# Patient Record
Sex: FEMALE | URBAN - METROPOLITAN AREA
[De-identification: names, ages, dates, MRNs, and addresses within clinical notes are randomized per-mention and may not be internally consistent; named-entity substitution may affect disease eponyms.]

---

## 2023-02-15 ENCOUNTER — OFFICE VISIT (OUTPATIENT)
Dept: URBAN - METROPOLITAN AREA CLINIC 128 | Facility: CLINIC | Age: 83
End: 2023-02-15
Payer: COMMERCIAL

## 2023-02-15 VITALS
HEIGHT: 64 IN | SYSTOLIC BLOOD PRESSURE: 132 MMHG | DIASTOLIC BLOOD PRESSURE: 66 MMHG | BODY MASS INDEX: 25.85 KG/M2 | TEMPERATURE: 97.6 F | WEIGHT: 151.4 LBS

## 2023-02-15 DIAGNOSIS — K59.09 CHANGE IN BOWEL MOVEMENTS INTERMITTENT CONSTIPATION. URGENCY IN THE MORNING.: ICD-10-CM

## 2023-02-15 PROBLEM — 35298007: Status: ACTIVE | Noted: 2023-02-15

## 2023-02-15 PROBLEM — 428283002 HISTORY OF POLYP OF COLON: Status: ACTIVE | Noted: 2023-02-15

## 2023-02-15 PROBLEM — 711150003: Status: ACTIVE | Noted: 2023-02-15

## 2023-02-15 PROCEDURE — 99243 OFF/OP CNSLTJ NEW/EST LOW 30: CPT | Performed by: INTERNAL MEDICINE

## 2023-02-15 PROCEDURE — 99203 OFFICE O/P NEW LOW 30 MIN: CPT | Performed by: INTERNAL MEDICINE

## 2023-02-15 RX ORDER — LEVOTHYROXINE SODIUM 88 UG/1
1 TABLET IN THE MORNING ON AN EMPTY STOMACH TABLET ORAL ONCE A DAY
Status: ACTIVE | COMMUNITY

## 2023-02-15 RX ORDER — SODIUM SULFATE, MAGNESIUM SULFATE, AND POTASSIUM CHLORIDE 17.75; 2.7; 2.25 G/1; G/1; G/1
12 TABLETS TABLET ORAL
Qty: 24 TABLETS | Refills: 0 | OUTPATIENT
Start: 2023-02-15 | End: 2023-02-16

## 2023-02-15 RX ORDER — ROSUVASTATIN CALCIUM 20 MG/1
1 TABLET TABLET, FILM COATED ORAL ONCE A DAY
Status: ACTIVE | COMMUNITY

## 2023-02-15 RX ORDER — ASPIRIN 81 MG/1
1 TABLET TABLET, COATED ORAL ONCE A DAY
Status: ACTIVE | COMMUNITY

## 2023-02-15 RX ORDER — LOSARTAN POTASSIUM 25 MG/1
1 TABLET TABLET ORAL ONCE A DAY
Status: ACTIVE | COMMUNITY

## 2023-02-15 RX ORDER — CLOPIDOGREL 75 MG/1
1 TABLET TABLET, FILM COATED ORAL ONCE A DAY
Status: ACTIVE | COMMUNITY

## 2023-02-15 NOTE — HPI-TODAY'S VISIT:
pt referred by dr. adebayo jimenez for colon cancer screening.  a copy of this note will be sent to referring physician.  pt notes prior colonoscopy in 2005 with no polyps 10 yr f/u given she notes mostly constipation that is responsive to otc chewable fiber she is on metformin for DM  also on plavix for stroke in 6/2022 also has essential tremor history x yrs no fam hx of colon cancer, colon polyps, or gastric cancer denies any reflux normal appetite no n/v no dysphagia.  no other complaints she has hypothyroidism and is on synthroid 88mcg

## 2023-12-01 ENCOUNTER — OFFICE VISIT (OUTPATIENT)
Dept: URBAN - METROPOLITAN AREA CLINIC 128 | Facility: CLINIC | Age: 83
End: 2023-12-01
Payer: COMMERCIAL

## 2023-12-01 ENCOUNTER — TELEPHONE ENCOUNTER (OUTPATIENT)
Dept: URBAN - METROPOLITAN AREA CLINIC 128 | Facility: CLINIC | Age: 83
End: 2023-12-01

## 2023-12-01 ENCOUNTER — DASHBOARD ENCOUNTERS (OUTPATIENT)
Age: 83
End: 2023-12-01

## 2023-12-01 VITALS
TEMPERATURE: 98.3 F | HEIGHT: 64 IN | BODY MASS INDEX: 25.44 KG/M2 | WEIGHT: 149 LBS | SYSTOLIC BLOOD PRESSURE: 148 MMHG | DIASTOLIC BLOOD PRESSURE: 60 MMHG | HEART RATE: 69 BPM

## 2023-12-01 DIAGNOSIS — Z12.11 SCREENING FOR COLON CANCER: ICD-10-CM

## 2023-12-01 DIAGNOSIS — K59.09 OTHER CONSTIPATION: ICD-10-CM

## 2023-12-01 DIAGNOSIS — R10.31 RIGHT LOWER QUADRANT ABDOMINAL PAIN: ICD-10-CM

## 2023-12-01 PROBLEM — 14760008: Status: ACTIVE | Noted: 2023-12-01

## 2023-12-01 PROCEDURE — 99213 OFFICE O/P EST LOW 20 MIN: CPT | Performed by: PHYSICIAN ASSISTANT

## 2023-12-01 RX ORDER — CLOPIDOGREL 75 MG/1
1 TABLET TABLET, FILM COATED ORAL ONCE A DAY
Status: ACTIVE | COMMUNITY

## 2023-12-01 RX ORDER — LOSARTAN POTASSIUM 25 MG/1
1 TABLET TABLET ORAL ONCE A DAY
Status: ACTIVE | COMMUNITY

## 2023-12-01 RX ORDER — ROSUVASTATIN CALCIUM 20 MG/1
1 TABLET TABLET, FILM COATED ORAL ONCE A DAY
Status: ACTIVE | COMMUNITY

## 2023-12-01 RX ORDER — ASPIRIN 81 MG/1
1 TABLET TABLET, COATED ORAL ONCE A DAY
Status: ACTIVE | COMMUNITY

## 2023-12-01 RX ORDER — LEVOTHYROXINE SODIUM 88 UG/1
1 TABLET IN THE MORNING ON AN EMPTY STOMACH TABLET ORAL ONCE A DAY
Status: ACTIVE | COMMUNITY

## 2023-12-01 NOTE — PHYSICAL EXAM GASTROINTESTINAL
Abdomen , soft, tenderness to the RLQ was noted on palpation, nondistended , no guarding or rigidity , no masses palpable , normal bowel sounds, old surgical scars noted, negative CVA tenderness bilaterally Liver and Spleen , no hepatosplenomegaly Rectal deferred

## 2023-12-01 NOTE — HPI-TODAY'S VISIT:
Patient referred by Dr. Kaleb Rodrigez for a colonoscopy. A copy of this note will be sent to referring physician.   pt notes prior colonoscopy in 2005 with no polyps 10 yr f/u given but never had it done She was seen by Dr. Salinas in 02/2023 and he ordered a colonoscopy and PCP clearance was needed to get a colonoscopy as she is on plavix for history of stroke but the request was sent to the PCP 5 times and my MA we did not get a response she notes mostly constipation that is responsive to otc chewable fiber She elicits having occasional RLQ abdominal pain that is chronic she is on metformin for DM  also on plavix for stroke in 6/2022 also has essential tremor history x yrs no fam hx of colon cancer, colon polyps, or gastric cancer denies any reflux normal appetite no n/v no dysphagia she has hypothyroidism and is on synthroid 88mcg

## 2024-01-10 ENCOUNTER — OFFICE VISIT (OUTPATIENT)
Dept: URBAN - METROPOLITAN AREA MEDICAL CENTER 25 | Facility: MEDICAL CENTER | Age: 84
End: 2024-01-10

## 2024-10-30 ENCOUNTER — APPOINTMENT (RX ONLY)
Age: 84
Setting detail: DERMATOLOGY
End: 2024-10-30

## 2024-10-30 DIAGNOSIS — D485 NEOPLASM OF UNCERTAIN BEHAVIOR OF SKIN: ICD-10-CM

## 2024-10-30 DIAGNOSIS — L81.0 POSTINFLAMMATORY HYPERPIGMENTATION: ICD-10-CM

## 2024-10-30 PROBLEM — D48.5 NEOPLASM OF UNCERTAIN BEHAVIOR OF SKIN: Status: ACTIVE | Noted: 2024-10-30

## 2024-10-30 PROCEDURE — ? ADDITIONAL NOTES

## 2024-10-30 PROCEDURE — ? COUNSELING

## 2024-10-30 PROCEDURE — 99203 OFFICE O/P NEW LOW 30 MIN: CPT

## 2024-10-30 ASSESSMENT — LOCATION SIMPLE DESCRIPTION DERM
LOCATION SIMPLE: NOSE
LOCATION SIMPLE: LEFT FOREHEAD

## 2024-10-30 ASSESSMENT — LOCATION ZONE DERM
LOCATION ZONE: NOSE
LOCATION ZONE: FACE

## 2024-10-30 ASSESSMENT — LOCATION DETAILED DESCRIPTION DERM
LOCATION DETAILED: NASAL DORSUM
LOCATION DETAILED: LEFT INFERIOR MEDIAL FOREHEAD

## 2024-10-30 NOTE — PROCEDURE: ADDITIONAL NOTES
Additional Notes: - pt was referred by Mohs Surgeon Willian Ochoa to have lesion evaluated\\n- pt is unsure of what\\n-Spoke with Dr. Ochoa's office who states pt had Bx on R nasal supratip on August 30, 2024 which showed BCC. \\n-Dr. Ochoa spoke with patient about biopsy results and treatment options. Pt was leaning towards radiation, so he sent her to [facility] for radiation consult. \\n-Pt signed records release, \\n***office working on obtaining these records***
Render Risk Assessment In Note?: no
Detail Level: Simple

## 2024-10-30 NOTE — PROCEDURE: MIPS QUALITY
Detail Level: Detailed
Quality 431: Preventive Care And Screening: Unhealthy Alcohol Use - Screening: Patient not identified as an unhealthy alcohol user when screened for unhealthy alcohol use using a systematic screening method
Name And Contact Information For Health Care Proxy: Kevin Dubose [phone]
Quality 226: Preventive Care And Screening: Tobacco Use: Screening And Cessation Intervention: Patient screened for tobacco use and is an ex/non-smoker
Quality 47: Advance Care Plan: Advance Care Planning discussed and documented; advance care plan or surrogate decision maker documented in the medical record.

## 2024-12-13 ENCOUNTER — APPOINTMENT (OUTPATIENT)
Age: 84
Setting detail: DERMATOLOGY
End: 2024-12-13

## 2024-12-13 PROBLEM — C44.311 BASAL CELL CARCINOMA OF SKIN OF NOSE: Status: ACTIVE | Noted: 2024-12-13

## 2024-12-13 PROCEDURE — 77290 THER RAD SIMULAJ FIELD CPLX: CPT

## 2024-12-13 PROCEDURE — ? SUPERFICIAL RADIOTHERAPY: SIMULATION VISIT

## 2024-12-13 PROCEDURE — ? SUPERFICIAL RADIATION TREATMENT

## 2024-12-13 PROCEDURE — 77300 RADIATION THERAPY DOSE PLAN: CPT

## 2024-12-13 NOTE — PROCEDURE: SUPERFICIAL RADIATION TREATMENT
Bill For Simulation: Yes - (Complex Simulation: 98793)
Validate Note Data (See Information Below): No
Field Number: Right Nasal Supratip ([id number])
Intro Statement (Will Not Render If Left Blank): The patient is undergoing superficial radiation therapy for skin cancer and presents for weekly evaluation and management.  Per protocol and as documented on the flow sheet, the patient was questioned as to subjective redness, pruritus, pain, drainage, fatigue, or any other symptoms.  Objectively, the radiation area was evaluated with regards to erythema, atrophy, scale, crusting, erosion, ulceration, edema, purpura, tenderness, warmth, drainage, and any other findings.  The plan was extensively reviewed including the dose, and dosing schedule.  The simulation and clinical setup was also reviewed as was the external and any internal shields and based on this review the appropriateness and sufficiency of treatment was determined.
Total Rx Dosage (Required): 5467.2
Energy (Optional-Please Include Units): 50 kV
Render Patient Eligibility And Selection In Note?: Yes
Port Dimensions-Y Axis In Cm: 3
Pathology Override (Pathology Will Render As Diagnosis Name If Left Blank): Superficial and Nodular BCC
Please Choose The Type Of Visit (Required): Treatment and Simulation Visit: Show Treatment/Simulation Variables
Fractionation Number Override: 0
Number Of Days Off Treatment: 1
Functional Status: 0 (fully active)
Daily Dosage Administered For All Fractions (Required): 321.6
Simple Simulation Afterword Text Will Be Included With Simple Simulations (Indications............): The patient had a complete consultation regarding all applicable modalities for the treatment of their skin cancer and based on a variety of factors including the type of tumor, size, and location, the relevant medical history as well as local tissue factors, the functional status of the individual, the ability to perform necessary postoperative wound instructions and the need for simultaneous treatments as well as overall wound healing status, it was determined that the patient would begin radiation therapy treatment for skin cancer.  A full simulation and treatment device design was performed including the determination and formulation of appropriate simple and complex devices including lead shield of 0.762 mm thickness to form molded customized shielding to specifically correlate with the lesion size including treatment margin.  The custom lead shield is adequate to accommodate the appropriate applicator and provide adequate shielding around the treatment site.  The specific field applicator, shields, and devices both simple and complex as well as the specific patient setup is outlined below.  The patient was given a full consent for superficial radiation to both verbally and in writing and the full determination of patient's eligibility for treatment and selection is outlined on the patient eligibility and treatment selection form.  The specific superficial radiotherapy prescription was determined and was documented on the superficial radiotherapy prescription form.  A treatment calculation was also performed and documented on the treatment calculation form.  Based on the prescription, the patient was scheduled for a series of fractional treatments.
Treatment Device Design After Initial Simulation Justification (Will Render If Bill For Treatment Devices = Yes): The patient is status post radiation simulation and is evaluated as to the use of additional devices for shielding and placement for radiation therapy.
Fractions / Week: 2
Dimensions-Y Axis In Cm: 0.4
Computed Treatment Time In Min (Will Render The Same As Calculated Treatment Time If Left Blank): 0.44
Detail Level: Detailed
Field Size (Applicator): 1.5 cm
Total Dose (Optional-Please Include Units): 5467.2 cGy
Additional Prescription Justification Text: If there is any interruption in treatment exceeding 5 days please see Decay and Dose Adjustment Calculation and complete treatment under Prescription 2, 3 or 4 as appropriate.
Simple Simulation Preamble Text Will Be Included With Simple Simulations (.......... Indications): Simple simulation was performed today for the following reasons:
Treatment Time / Fractionation (Optional- Include Units): 731 cGy/sec
Patient Positioning: Supine
Time Dose Fractionation (Optional- Include Units If Applicable): 0.44 min
Total Planned Fractions: 17
Assessment: Appropriate reaction
Energy Output In Cgy/Min (Optional): 731
Fractionation Number (Evaluation): No Treatment Administered
Ultrasound Used Text: Ultrasound was utilized to place radiation therapy fields.
Information: Selecting Yes will display possible errors in your note based on the variables you have selected. This validation is only offered as a suggestion for you. PLEASE NOTE THAT THE VALIDATION TEXT WILL BE REMOVED WHEN YOU FINALIZE YOUR NOTE. IF YOU WANT TO FAX A PRELIMINARY NOTE YOU WILL NEED TO TOGGLE THIS TO 'NO' IF YOU DO NOT WANT IT IN YOUR FAXED NOTE.
Dimensions-X Axis In Cm: 0.3
Other Assessment (Will Overide Current Assessment): Appropriate for SRT
Ultrasound Used Text: Patient has a location which was not amenable to ultrasound.
Custom Shielding Afterword Text Will Not Be Included With Simple Simulations (X X Y Cm............): port to correlate with the lesion size, including treatment margin. The custom lead shield is adequate to accommodate the appropriate applicator and provide adequate shielding around the treatment site. Additional shielding (as noted below) is used to protect sensitive, normal tissues.
Ssd In Cm (Optional): 15
Custom Shielding Preamble Text Will Not Be Included With Simple Simulations (.......... X X Y Cm): A lead shield of 0.762 mm thickness is utilized to form a molded, custom shield with a
Daily Fractionated Dose (Optional- Include Units): 321.6 cGy
Radiation Units: cGy
Use Automated Fraction Number And Cumulative Dosage?: Yes (Use Enhanced Automation)
Treatment Margins In Cm: 0.5

## 2024-12-13 NOTE — PROCEDURE: SUPERFICIAL RADIOTHERAPY: SIMULATION VISIT
Bill For Dosimetry/Prescription Creation (62327): Yes
Bill For Treatment Device Design (70929, 03798, 67928): No
Patient Positioning: Sitting

## 2024-12-18 ENCOUNTER — APPOINTMENT (OUTPATIENT)
Age: 84
Setting detail: DERMATOLOGY
End: 2024-12-18

## 2024-12-18 PROBLEM — C44.311 BASAL CELL CARCINOMA OF SKIN OF NOSE: Status: ACTIVE | Noted: 2024-12-18

## 2024-12-18 PROCEDURE — 77280 THER RAD SIMULAJ FIELD SMPL: CPT

## 2024-12-18 PROCEDURE — ? SUPERFICIAL RADIATION TREATMENT

## 2024-12-18 PROCEDURE — 77401: CPT

## 2024-12-18 NOTE — PROCEDURE: SUPERFICIAL RADIATION TREATMENT
Bill For Simulation: Yes - (Simple Simulation: 21935)
Validate Note Data (See Information Below): No
Field Number: Right Nasal Supratip ([id number])
Intro Statement (Will Not Render If Left Blank): The patient is undergoing superficial radiation therapy for skin cancer and presents for weekly evaluation and management.  Per protocol and as documented on the flow sheet, the patient was questioned as to subjective redness, pruritus, pain, drainage, fatigue, or any other symptoms.  Objectively, the radiation area was evaluated with regards to erythema, atrophy, scale, crusting, erosion, ulceration, edema, purpura, tenderness, warmth, drainage, and any other findings.  The plan was extensively reviewed including the dose, and dosing schedule.  The simulation and clinical setup was also reviewed as was the external and any internal shields and based on this review the appropriateness and sufficiency of treatment was determined.
Total Rx Dosage (Required): 5467.2
Energy (Optional-Please Include Units): 50 kV
Render Patient Eligibility And Selection In Note?: Yes
Port Dimensions-Y Axis In Cm: 3
Pathology Override (Pathology Will Render As Diagnosis Name If Left Blank): Superficial and Nodular BCC
Please Choose The Type Of Visit (Required): Treatment and Simulation Visit: Show Treatment/Simulation Variables
Fractionation Number Override: 0
Number Of Days Off Treatment: 1
Functional Status: 0 (fully active)
Daily Dosage Administered For All Fractions (Required): 321.6
Simple Simulation Afterword Text Will Be Included With Simple Simulations (Indications............): The patient had a complete consultation regarding all applicable modalities for the treatment of their skin cancer and based on a variety of factors including the type of tumor, size, and location, the relevant medical history as well as local tissue factors, the functional status of the individual, the ability to perform necessary postoperative wound instructions and the need for simultaneous treatments as well as overall wound healing status, it was determined that the patient would begin radiation therapy treatment for skin cancer.  A full simulation and treatment device design was performed including the determination and formulation of appropriate simple and complex devices including lead shield of 0.762 mm thickness to form molded customized shielding to specifically correlate with the lesion size including treatment margin.  The custom lead shield is adequate to accommodate the appropriate applicator and provide adequate shielding around the treatment site.  The specific field applicator, shields, and devices both simple and complex as well as the specific patient setup is outlined below.  The patient was given a full consent for superficial radiation to both verbally and in writing and the full determination of patient's eligibility for treatment and selection is outlined on the patient eligibility and treatment selection form.  The specific superficial radiotherapy prescription was determined and was documented on the superficial radiotherapy prescription form.  A treatment calculation was also performed and documented on the treatment calculation form.  Based on the prescription, the patient was scheduled for a series of fractional treatments.
Treatment Device Design After Initial Simulation Justification (Will Render If Bill For Treatment Devices = Yes): The patient is status post radiation simulation and is evaluated as to the use of additional devices for shielding and placement for radiation therapy.
Fractions / Week: 2
Dimensions-Y Axis In Cm: 0.4
Computed Treatment Time In Min (Will Render The Same As Calculated Treatment Time If Left Blank): 0.44
Detail Level: Detailed
Field Size (Applicator): 1.5 cm
Total Dose (Optional-Please Include Units): 5467.2 cGy
Additional Prescription Justification Text: If there is any interruption in treatment exceeding 5 days please see Decay and Dose Adjustment Calculation and complete treatment under Prescription 2, 3 or 4 as appropriate.
Simple Simulation Preamble Text Will Be Included With Simple Simulations (.......... Indications): Simple simulation was performed today for the following reasons:
Treatment Time / Fractionation (Optional- Include Units): 731 cGy/sec
Patient Positioning: Supine
Time Dose Fractionation (Optional- Include Units If Applicable): 0.44 min
Total Planned Fractions: 17
Assessment: Appropriate reaction
Energy Output In Cgy/Min (Optional): 731
Fractionation Number (Evaluation): No Treatment Administered
Ultrasound Used Text: Ultrasound was utilized to place radiation therapy fields.
Information: Selecting Yes will display possible errors in your note based on the variables you have selected. This validation is only offered as a suggestion for you. PLEASE NOTE THAT THE VALIDATION TEXT WILL BE REMOVED WHEN YOU FINALIZE YOUR NOTE. IF YOU WANT TO FAX A PRELIMINARY NOTE YOU WILL NEED TO TOGGLE THIS TO 'NO' IF YOU DO NOT WANT IT IN YOUR FAXED NOTE.
Dimensions-X Axis In Cm: 0.3
Other Assessment (Will Overide Current Assessment): Appropriate for SRT
Ultrasound Used Text: Patient has a location which was not amenable to ultrasound.
Custom Shielding Afterword Text Will Not Be Included With Simple Simulations (X X Y Cm............): port to correlate with the lesion size, including treatment margin. The custom lead shield is adequate to accommodate the appropriate applicator and provide adequate shielding around the treatment site. Additional shielding (as noted below) is used to protect sensitive, normal tissues.
Ssd In Cm (Optional): 15
Date Of Fraction 1: 12/18/2024
Custom Shielding Preamble Text Will Not Be Included With Simple Simulations (.......... X X Y Cm): A lead shield of 0.762 mm thickness is utilized to form a molded, custom shield with a
Daily Fractionated Dose (Optional- Include Units): 321.6 cGy
Radiation Units: cGy
Use Automated Fraction Number And Cumulative Dosage?: Yes (Use Enhanced Automation)
Treatment Margins In Cm: 0.5

## 2024-12-20 ENCOUNTER — APPOINTMENT (OUTPATIENT)
Age: 84
Setting detail: DERMATOLOGY
End: 2024-12-20

## 2024-12-20 PROBLEM — C44.311 BASAL CELL CARCINOMA OF SKIN OF NOSE: Status: ACTIVE | Noted: 2024-12-20

## 2024-12-20 PROCEDURE — 77401: CPT

## 2024-12-20 PROCEDURE — 77280 THER RAD SIMULAJ FIELD SMPL: CPT

## 2024-12-20 PROCEDURE — ? SUPERFICIAL RADIATION TREATMENT

## 2024-12-20 NOTE — PROCEDURE: SUPERFICIAL RADIATION TREATMENT
Bill For Simulation: Yes - (Simple Simulation: 05569)
Validate Note Data (See Information Below): No
Field Number: Right Nasal Supratip ([id number])
Intro Statement (Will Not Render If Left Blank): The patient is undergoing superficial radiation therapy for skin cancer and presents for weekly evaluation and management.  Per protocol and as documented on the flow sheet, the patient was questioned as to subjective redness, pruritus, pain, drainage, fatigue, or any other symptoms.  Objectively, the radiation area was evaluated with regards to erythema, atrophy, scale, crusting, erosion, ulceration, edema, purpura, tenderness, warmth, drainage, and any other findings.  The plan was extensively reviewed including the dose, and dosing schedule.  The simulation and clinical setup was also reviewed as was the external and any internal shields and based on this review the appropriateness and sufficiency of treatment was determined.
Total Rx Dosage (Required): 5467.2
Date Of Fraction 2: 12/20/2024
Energy (Optional-Please Include Units): 50 kV
Render Patient Eligibility And Selection In Note?: Yes
Port Dimensions-Y Axis In Cm: 3
Pathology Override (Pathology Will Render As Diagnosis Name If Left Blank): Superficial and Nodular BCC
Please Choose The Type Of Visit (Required): Treatment and Simulation Visit: Show Treatment/Simulation Variables
Fractionation Number Override: 0
Number Of Days Off Treatment: 1
Functional Status: 0 (fully active)
Daily Dosage Administered For All Fractions (Required): 321.6
Simple Simulation Afterword Text Will Be Included With Simple Simulations (Indications............): The patient had a complete consultation regarding all applicable modalities for the treatment of their skin cancer and based on a variety of factors including the type of tumor, size, and location, the relevant medical history as well as local tissue factors, the functional status of the individual, the ability to perform necessary postoperative wound instructions and the need for simultaneous treatments as well as overall wound healing status, it was determined that the patient would begin radiation therapy treatment for skin cancer.  A full simulation and treatment device design was performed including the determination and formulation of appropriate simple and complex devices including lead shield of 0.762 mm thickness to form molded customized shielding to specifically correlate with the lesion size including treatment margin.  The custom lead shield is adequate to accommodate the appropriate applicator and provide adequate shielding around the treatment site.  The specific field applicator, shields, and devices both simple and complex as well as the specific patient setup is outlined below.  The patient was given a full consent for superficial radiation to both verbally and in writing and the full determination of patient's eligibility for treatment and selection is outlined on the patient eligibility and treatment selection form.  The specific superficial radiotherapy prescription was determined and was documented on the superficial radiotherapy prescription form.  A treatment calculation was also performed and documented on the treatment calculation form.  Based on the prescription, the patient was scheduled for a series of fractional treatments.
Treatment Device Design After Initial Simulation Justification (Will Render If Bill For Treatment Devices = Yes): The patient is status post radiation simulation and is evaluated as to the use of additional devices for shielding and placement for radiation therapy.
Fractions / Week: 2
Dimensions-Y Axis In Cm: 0.4
Computed Treatment Time In Min (Will Render The Same As Calculated Treatment Time If Left Blank): 0.44
Detail Level: Detailed
Field Size (Applicator): 1.5 cm
Total Dose (Optional-Please Include Units): 5467.2 cGy
Additional Prescription Justification Text: If there is any interruption in treatment exceeding 5 days please see Decay and Dose Adjustment Calculation and complete treatment under Prescription 2, 3 or 4 as appropriate.
Simple Simulation Preamble Text Will Be Included With Simple Simulations (.......... Indications): Simple simulation was performed today for the following reasons:
Treatment Time / Fractionation (Optional- Include Units): 731 cGy/sec
Patient Positioning: Supine
Time Dose Fractionation (Optional- Include Units If Applicable): 0.44 min
Total Planned Fractions: 17
Assessment: Appropriate reaction
Energy Output In Cgy/Min (Optional): 731
Fractionation Number (Evaluation): No Treatment Administered
Ultrasound Used Text: Ultrasound was utilized to place radiation therapy fields.
Information: Selecting Yes will display possible errors in your note based on the variables you have selected. This validation is only offered as a suggestion for you. PLEASE NOTE THAT THE VALIDATION TEXT WILL BE REMOVED WHEN YOU FINALIZE YOUR NOTE. IF YOU WANT TO FAX A PRELIMINARY NOTE YOU WILL NEED TO TOGGLE THIS TO 'NO' IF YOU DO NOT WANT IT IN YOUR FAXED NOTE.
Dimensions-X Axis In Cm: 0.3
Other Assessment (Will Overide Current Assessment): Appropriate for SRT
Ultrasound Used Text: Patient has a location which was not amenable to ultrasound.
Custom Shielding Afterword Text Will Not Be Included With Simple Simulations (X X Y Cm............): port to correlate with the lesion size, including treatment margin. The custom lead shield is adequate to accommodate the appropriate applicator and provide adequate shielding around the treatment site. Additional shielding (as noted below) is used to protect sensitive, normal tissues.
Ssd In Cm (Optional): 15
Date Of Fraction 1: 12/18/2024
Custom Shielding Preamble Text Will Not Be Included With Simple Simulations (.......... X X Y Cm): A lead shield of 0.762 mm thickness is utilized to form a molded, custom shield with a
Daily Fractionated Dose (Optional- Include Units): 321.6 cGy
Radiation Units: cGy
Use Automated Fraction Number And Cumulative Dosage?: Yes (Use Enhanced Automation)
Treatment Margins In Cm: 0.5

## 2025-01-02 ENCOUNTER — APPOINTMENT (OUTPATIENT)
Age: 85
Setting detail: DERMATOLOGY
End: 2025-01-02

## 2025-01-02 PROBLEM — C44.311 BASAL CELL CARCINOMA OF SKIN OF NOSE: Status: ACTIVE | Noted: 2025-01-02

## 2025-01-02 PROCEDURE — ? SUPERFICIAL RADIATION TREATMENT

## 2025-01-02 PROCEDURE — 77280 THER RAD SIMULAJ FIELD SMPL: CPT

## 2025-01-02 PROCEDURE — 77401: CPT

## 2025-01-02 NOTE — PROCEDURE: SUPERFICIAL RADIATION TREATMENT
Bill For Simulation: Yes - (Simple Simulation: 15752)
Validate Note Data (See Information Below): No
Field Number: Right Nasal Supratip ([id number])
Intro Statement (Will Not Render If Left Blank): The patient is undergoing superficial radiation therapy for skin cancer and presents for weekly evaluation and management.  Per protocol and as documented on the flow sheet, the patient was questioned as to subjective redness, pruritus, pain, drainage, fatigue, or any other symptoms.  Objectively, the radiation area was evaluated with regards to erythema, atrophy, scale, crusting, erosion, ulceration, edema, purpura, tenderness, warmth, drainage, and any other findings.  The plan was extensively reviewed including the dose, and dosing schedule.  The simulation and clinical setup was also reviewed as was the external and any internal shields and based on this review the appropriateness and sufficiency of treatment was determined.
Total Rx Dosage (Required): 5467.2
Date Of Fraction 2: 12/20/2024
Energy (Optional-Please Include Units): 50 kV
Render Patient Eligibility And Selection In Note?: Yes
Port Dimensions-Y Axis In Cm: 3
Pathology Override (Pathology Will Render As Diagnosis Name If Left Blank): Superficial and Nodular BCC
Please Choose The Type Of Visit (Required): Treatment and Simulation Visit: Show Treatment/Simulation Variables
Fractionation Number Override: 0
Date Of Fraction 3: 01/02/2025
Number Of Days Off Treatment: 1
Functional Status: 0 (fully active)
Daily Dosage Administered For All Fractions (Required): 321.6
Simple Simulation Afterword Text Will Be Included With Simple Simulations (Indications............): The patient had a complete consultation regarding all applicable modalities for the treatment of their skin cancer and based on a variety of factors including the type of tumor, size, and location, the relevant medical history as well as local tissue factors, the functional status of the individual, the ability to perform necessary postoperative wound instructions and the need for simultaneous treatments as well as overall wound healing status, it was determined that the patient would begin radiation therapy treatment for skin cancer.  A full simulation and treatment device design was performed including the determination and formulation of appropriate simple and complex devices including lead shield of 0.762 mm thickness to form molded customized shielding to specifically correlate with the lesion size including treatment margin.  The custom lead shield is adequate to accommodate the appropriate applicator and provide adequate shielding around the treatment site.  The specific field applicator, shields, and devices both simple and complex as well as the specific patient setup is outlined below.  The patient was given a full consent for superficial radiation to both verbally and in writing and the full determination of patient's eligibility for treatment and selection is outlined on the patient eligibility and treatment selection form.  The specific superficial radiotherapy prescription was determined and was documented on the superficial radiotherapy prescription form.  A treatment calculation was also performed and documented on the treatment calculation form.  Based on the prescription, the patient was scheduled for a series of fractional treatments.
Treatment Device Design After Initial Simulation Justification (Will Render If Bill For Treatment Devices = Yes): The patient is status post radiation simulation and is evaluated as to the use of additional devices for shielding and placement for radiation therapy.
Fractions / Week: 2
Dimensions-Y Axis In Cm: 0.4
Computed Treatment Time In Min (Will Render The Same As Calculated Treatment Time If Left Blank): 0.44
Detail Level: Detailed
Field Size (Applicator): 1.5 cm
Total Dose (Optional-Please Include Units): 5467.2 cGy
Additional Prescription Justification Text: If there is any interruption in treatment exceeding 5 days please see Decay and Dose Adjustment Calculation and complete treatment under Prescription 2, 3 or 4 as appropriate.
Simple Simulation Preamble Text Will Be Included With Simple Simulations (.......... Indications): Simple simulation was performed today for the following reasons:
Treatment Time / Fractionation (Optional- Include Units): 731 cGy/sec
Patient Positioning: Supine
Time Dose Fractionation (Optional- Include Units If Applicable): 0.44 min
Total Planned Fractions: 17
Assessment: Appropriate reaction
Energy Output In Cgy/Min (Optional): 731
Fractionation Number (Evaluation): No Treatment Administered
Ultrasound Used Text: Ultrasound was utilized to place radiation therapy fields.
Information: Selecting Yes will display possible errors in your note based on the variables you have selected. This validation is only offered as a suggestion for you. PLEASE NOTE THAT THE VALIDATION TEXT WILL BE REMOVED WHEN YOU FINALIZE YOUR NOTE. IF YOU WANT TO FAX A PRELIMINARY NOTE YOU WILL NEED TO TOGGLE THIS TO 'NO' IF YOU DO NOT WANT IT IN YOUR FAXED NOTE.
Dimensions-X Axis In Cm: 0.3
Other Assessment (Will Overide Current Assessment): Appropriate for SRT
Ultrasound Used Text: Patient has a location which was not amenable to ultrasound.
Custom Shielding Afterword Text Will Not Be Included With Simple Simulations (X X Y Cm............): port to correlate with the lesion size, including treatment margin. The custom lead shield is adequate to accommodate the appropriate applicator and provide adequate shielding around the treatment site. Additional shielding (as noted below) is used to protect sensitive, normal tissues.
Ssd In Cm (Optional): 15
Date Of Fraction 1: 12/18/2024
Custom Shielding Preamble Text Will Not Be Included With Simple Simulations (.......... X X Y Cm): A lead shield of 0.762 mm thickness is utilized to form a molded, custom shield with a
Daily Fractionated Dose (Optional- Include Units): 321.6 cGy
Radiation Units: cGy
Use Automated Fraction Number And Cumulative Dosage?: Yes (Use Enhanced Automation)
Treatment Margins In Cm: 0.5

## 2025-02-11 ENCOUNTER — APPOINTMENT (OUTPATIENT)
Age: 85
Setting detail: DERMATOLOGY
End: 2025-02-11

## 2025-02-11 PROBLEM — C44.311 BASAL CELL CARCINOMA OF SKIN OF NOSE: Status: ACTIVE | Noted: 2025-02-11

## 2025-02-11 PROCEDURE — ? SUPERFICIAL RADIATION TREATMENT

## 2025-02-11 PROCEDURE — 77401: CPT

## 2025-02-11 PROCEDURE — 77280 THER RAD SIMULAJ FIELD SMPL: CPT

## 2025-02-11 NOTE — PROCEDURE: SUPERFICIAL RADIATION TREATMENT
Bill For Simulation: Yes - (Simple Simulation: 54593)
Validate Note Data (See Information Below): No
Field Number: Right Nasal Supratip ([id number])
Intro Statement (Will Not Render If Left Blank): The patient is undergoing superficial radiation therapy for skin cancer and presents for weekly evaluation and management.  Per protocol and as documented on the flow sheet, the patient was questioned as to subjective redness, pruritus, pain, drainage, fatigue, or any other symptoms.  Objectively, the radiation area was evaluated with regards to erythema, atrophy, scale, crusting, erosion, ulceration, edema, purpura, tenderness, warmth, drainage, and any other findings.  The plan was extensively reviewed including the dose, and dosing schedule.  The simulation and clinical setup was also reviewed as was the external and any internal shields and based on this review the appropriateness and sufficiency of treatment was determined.
Total Rx Dosage (Required): 5467.2
Date Of Fraction 2: 12/20/2024
Energy (Optional-Please Include Units): 50 kV
Render Patient Eligibility And Selection In Note?: Yes
Port Dimensions-Y Axis In Cm: 3
Pathology Override (Pathology Will Render As Diagnosis Name If Left Blank): Superficial and Nodular BCC
Please Choose The Type Of Visit (Required): Treatment and Simulation Visit: Show Treatment/Simulation Variables
Fractionation Number Override: 0
Date Of Fraction 3: 01/02/2025
Number Of Days Off Treatment: 1
Functional Status: 0 (fully active)
Daily Dosage Administered For All Fractions (Required): 321.6
Simple Simulation Afterword Text Will Be Included With Simple Simulations (Indications............): The patient had a complete consultation regarding all applicable modalities for the treatment of their skin cancer and based on a variety of factors including the type of tumor, size, and location, the relevant medical history as well as local tissue factors, the functional status of the individual, the ability to perform necessary postoperative wound instructions and the need for simultaneous treatments as well as overall wound healing status, it was determined that the patient would begin radiation therapy treatment for skin cancer.  A full simulation and treatment device design was performed including the determination and formulation of appropriate simple and complex devices including lead shield of 0.762 mm thickness to form molded customized shielding to specifically correlate with the lesion size including treatment margin.  The custom lead shield is adequate to accommodate the appropriate applicator and provide adequate shielding around the treatment site.  The specific field applicator, shields, and devices both simple and complex as well as the specific patient setup is outlined below.  The patient was given a full consent for superficial radiation to both verbally and in writing and the full determination of patient's eligibility for treatment and selection is outlined on the patient eligibility and treatment selection form.  The specific superficial radiotherapy prescription was determined and was documented on the superficial radiotherapy prescription form.  A treatment calculation was also performed and documented on the treatment calculation form.  Based on the prescription, the patient was scheduled for a series of fractional treatments.
Treatment Device Design After Initial Simulation Justification (Will Render If Bill For Treatment Devices = Yes): The patient is status post radiation simulation and is evaluated as to the use of additional devices for shielding and placement for radiation therapy.
Fractions / Week: 2
Dimensions-Y Axis In Cm: 0.4
Computed Treatment Time In Min (Will Render The Same As Calculated Treatment Time If Left Blank): 0.44
Detail Level: Detailed
Field Size (Applicator): 1.5 cm
Total Dose (Optional-Please Include Units): 5467.2 cGy
Additional Prescription Justification Text: If there is any interruption in treatment exceeding 5 days please see Decay and Dose Adjustment Calculation and complete treatment under Prescription 2, 3 or 4 as appropriate.
Simple Simulation Preamble Text Will Be Included With Simple Simulations (.......... Indications): Simple simulation was performed today for the following reasons:
Date Of Fraction 4: 02/11/2025
Treatment Time / Fractionation (Optional- Include Units): 731 cGy/sec
Patient Positioning: Supine
Time Dose Fractionation (Optional- Include Units If Applicable): 0.44 min
Total Planned Fractions: 17
Assessment: Appropriate reaction
Energy Output In Cgy/Min (Optional): 731
Fractionation Number (Evaluation): No Treatment Administered
Ultrasound Used Text: Ultrasound was utilized to place radiation therapy fields.
Information: Selecting Yes will display possible errors in your note based on the variables you have selected. This validation is only offered as a suggestion for you. PLEASE NOTE THAT THE VALIDATION TEXT WILL BE REMOVED WHEN YOU FINALIZE YOUR NOTE. IF YOU WANT TO FAX A PRELIMINARY NOTE YOU WILL NEED TO TOGGLE THIS TO 'NO' IF YOU DO NOT WANT IT IN YOUR FAXED NOTE.
Dimensions-X Axis In Cm: 0.3
Other Assessment (Will Overide Current Assessment): Appropriate for SRT
Ultrasound Used Text: Patient has a location which was not amenable to ultrasound.
Custom Shielding Afterword Text Will Not Be Included With Simple Simulations (X X Y Cm............): port to correlate with the lesion size, including treatment margin. The custom lead shield is adequate to accommodate the appropriate applicator and provide adequate shielding around the treatment site. Additional shielding (as noted below) is used to protect sensitive, normal tissues.
Ssd In Cm (Optional): 15
Date Of Fraction 1: 12/18/2024
Custom Shielding Preamble Text Will Not Be Included With Simple Simulations (.......... X X Y Cm): A lead shield of 0.762 mm thickness is utilized to form a molded, custom shield with a
Daily Fractionated Dose (Optional- Include Units): 321.6 cGy
Radiation Units: cGy
Use Automated Fraction Number And Cumulative Dosage?: Yes (Use Enhanced Automation)
Treatment Margins In Cm: 0.5

## 2025-02-13 ENCOUNTER — APPOINTMENT (OUTPATIENT)
Age: 85
Setting detail: DERMATOLOGY
End: 2025-02-13

## 2025-02-13 PROBLEM — C44.311 BASAL CELL CARCINOMA OF SKIN OF NOSE: Status: ACTIVE | Noted: 2025-02-13

## 2025-02-13 PROCEDURE — 77401: CPT

## 2025-02-13 PROCEDURE — ? SUPERFICIAL RADIATION TREATMENT

## 2025-02-13 PROCEDURE — 77280 THER RAD SIMULAJ FIELD SMPL: CPT

## 2025-02-13 NOTE — PROCEDURE: SUPERFICIAL RADIATION TREATMENT
Bill For Simulation: Yes - (Simple Simulation: 88247)
Validate Note Data (See Information Below): No
Field Number: Right Nasal Supratip ([id number])
Intro Statement (Will Not Render If Left Blank): The patient is undergoing superficial radiation therapy for skin cancer and presents for weekly evaluation and management.  Per protocol and as documented on the flow sheet, the patient was questioned as to subjective redness, pruritus, pain, drainage, fatigue, or any other symptoms.  Objectively, the radiation area was evaluated with regards to erythema, atrophy, scale, crusting, erosion, ulceration, edema, purpura, tenderness, warmth, drainage, and any other findings.  The plan was extensively reviewed including the dose, and dosing schedule.  The simulation and clinical setup was also reviewed as was the external and any internal shields and based on this review the appropriateness and sufficiency of treatment was determined.
Total Rx Dosage (Required): 5467.2
Date Of Fraction 2: 12/20/2024
Energy (Optional-Please Include Units): 50 kV
Render Patient Eligibility And Selection In Note?: Yes
Port Dimensions-Y Axis In Cm: 3
Pathology Override (Pathology Will Render As Diagnosis Name If Left Blank): Superficial and Nodular BCC
Please Choose The Type Of Visit (Required): Treatment and Simulation Visit: Show Treatment/Simulation Variables
Fractionation Number Override: 0
Date Of Fraction 3: 01/02/2025
Number Of Days Off Treatment: 1
Functional Status: 0 (fully active)
Daily Dosage Administered For All Fractions (Required): 321.6
Simple Simulation Afterword Text Will Be Included With Simple Simulations (Indications............): The patient had a complete consultation regarding all applicable modalities for the treatment of their skin cancer and based on a variety of factors including the type of tumor, size, and location, the relevant medical history as well as local tissue factors, the functional status of the individual, the ability to perform necessary postoperative wound instructions and the need for simultaneous treatments as well as overall wound healing status, it was determined that the patient would begin radiation therapy treatment for skin cancer.  A full simulation and treatment device design was performed including the determination and formulation of appropriate simple and complex devices including lead shield of 0.762 mm thickness to form molded customized shielding to specifically correlate with the lesion size including treatment margin.  The custom lead shield is adequate to accommodate the appropriate applicator and provide adequate shielding around the treatment site.  The specific field applicator, shields, and devices both simple and complex as well as the specific patient setup is outlined below.  The patient was given a full consent for superficial radiation to both verbally and in writing and the full determination of patient's eligibility for treatment and selection is outlined on the patient eligibility and treatment selection form.  The specific superficial radiotherapy prescription was determined and was documented on the superficial radiotherapy prescription form.  A treatment calculation was also performed and documented on the treatment calculation form.  Based on the prescription, the patient was scheduled for a series of fractional treatments.
Treatment Device Design After Initial Simulation Justification (Will Render If Bill For Treatment Devices = Yes): The patient is status post radiation simulation and is evaluated as to the use of additional devices for shielding and placement for radiation therapy.
Fractions / Week: 2
Dimensions-Y Axis In Cm: 0.4
Computed Treatment Time In Min (Will Render The Same As Calculated Treatment Time If Left Blank): 0.44
Detail Level: Detailed
Field Size (Applicator): 1.5 cm
Total Dose (Optional-Please Include Units): 5467.2 cGy
Additional Prescription Justification Text: If there is any interruption in treatment exceeding 5 days please see Decay and Dose Adjustment Calculation and complete treatment under Prescription 2, 3 or 4 as appropriate.
Simple Simulation Preamble Text Will Be Included With Simple Simulations (.......... Indications): Simple simulation was performed today for the following reasons:
Date Of Fraction 4: 02/11/2025
Treatment Time / Fractionation (Optional- Include Units): 731 cGy/sec
Patient Positioning: Supine
Date Of Fraction 5: 02/13/2025
Time Dose Fractionation (Optional- Include Units If Applicable): 0.44 min
Total Planned Fractions: 17
Assessment: Appropriate reaction
Energy Output In Cgy/Min (Optional): 731
Fractionation Number (Evaluation): No Treatment Administered
Ultrasound Used Text: Ultrasound was utilized to place radiation therapy fields.
Information: Selecting Yes will display possible errors in your note based on the variables you have selected. This validation is only offered as a suggestion for you. PLEASE NOTE THAT THE VALIDATION TEXT WILL BE REMOVED WHEN YOU FINALIZE YOUR NOTE. IF YOU WANT TO FAX A PRELIMINARY NOTE YOU WILL NEED TO TOGGLE THIS TO 'NO' IF YOU DO NOT WANT IT IN YOUR FAXED NOTE.
Dimensions-X Axis In Cm: 0.3
Other Assessment (Will Overide Current Assessment): Appropriate for SRT
Ultrasound Used Text: Patient has a location which was not amenable to ultrasound.
Custom Shielding Afterword Text Will Not Be Included With Simple Simulations (X X Y Cm............): port to correlate with the lesion size, including treatment margin. The custom lead shield is adequate to accommodate the appropriate applicator and provide adequate shielding around the treatment site. Additional shielding (as noted below) is used to protect sensitive, normal tissues.
Ssd In Cm (Optional): 15
Date Of Fraction 1: 12/18/2024
Custom Shielding Preamble Text Will Not Be Included With Simple Simulations (.......... X X Y Cm): A lead shield of 0.762 mm thickness is utilized to form a molded, custom shield with a
Daily Fractionated Dose (Optional- Include Units): 321.6 cGy
Radiation Units: cGy
Use Automated Fraction Number And Cumulative Dosage?: Yes (Use Enhanced Automation)
Treatment Margins In Cm: 0.5

## 2025-02-18 ENCOUNTER — APPOINTMENT (OUTPATIENT)
Age: 85
Setting detail: DERMATOLOGY
End: 2025-02-18

## 2025-02-18 PROBLEM — C44.311 BASAL CELL CARCINOMA OF SKIN OF NOSE: Status: ACTIVE | Noted: 2025-02-18

## 2025-02-18 PROCEDURE — ? SUPERFICIAL RADIATION TREATMENT

## 2025-02-18 PROCEDURE — 77401: CPT

## 2025-02-18 PROCEDURE — 77280 THER RAD SIMULAJ FIELD SMPL: CPT

## 2025-02-18 NOTE — PROCEDURE: SUPERFICIAL RADIATION TREATMENT
Bill For Simulation: Yes - (Simple Simulation: 49408)
Validate Note Data (See Information Below): No
Field Number: Right Nasal Supratip ([id number])
Intro Statement (Will Not Render If Left Blank): The patient is undergoing superficial radiation therapy for skin cancer and presents for weekly evaluation and management.  Per protocol and as documented on the flow sheet, the patient was questioned as to subjective redness, pruritus, pain, drainage, fatigue, or any other symptoms.  Objectively, the radiation area was evaluated with regards to erythema, atrophy, scale, crusting, erosion, ulceration, edema, purpura, tenderness, warmth, drainage, and any other findings.  The plan was extensively reviewed including the dose, and dosing schedule.  The simulation and clinical setup was also reviewed as was the external and any internal shields and based on this review the appropriateness and sufficiency of treatment was determined.
Total Rx Dosage (Required): 5467.2
Date Of Fraction 2: 12/20/2024
Energy (Optional-Please Include Units): 50 kV
Render Patient Eligibility And Selection In Note?: Yes
Port Dimensions-Y Axis In Cm: 3
Pathology Override (Pathology Will Render As Diagnosis Name If Left Blank): Superficial and Nodular BCC
Please Choose The Type Of Visit (Required): Treatment and Simulation Visit: Show Treatment/Simulation Variables
Fractionation Number Override: 0
Date Of Fraction 3: 01/02/2025
Number Of Days Off Treatment: 1
Functional Status: 0 (fully active)
Daily Dosage Administered For All Fractions (Required): 321.6
Simple Simulation Afterword Text Will Be Included With Simple Simulations (Indications............): The patient had a complete consultation regarding all applicable modalities for the treatment of their skin cancer and based on a variety of factors including the type of tumor, size, and location, the relevant medical history as well as local tissue factors, the functional status of the individual, the ability to perform necessary postoperative wound instructions and the need for simultaneous treatments as well as overall wound healing status, it was determined that the patient would begin radiation therapy treatment for skin cancer.  A full simulation and treatment device design was performed including the determination and formulation of appropriate simple and complex devices including lead shield of 0.762 mm thickness to form molded customized shielding to specifically correlate with the lesion size including treatment margin.  The custom lead shield is adequate to accommodate the appropriate applicator and provide adequate shielding around the treatment site.  The specific field applicator, shields, and devices both simple and complex as well as the specific patient setup is outlined below.  The patient was given a full consent for superficial radiation to both verbally and in writing and the full determination of patient's eligibility for treatment and selection is outlined on the patient eligibility and treatment selection form.  The specific superficial radiotherapy prescription was determined and was documented on the superficial radiotherapy prescription form.  A treatment calculation was also performed and documented on the treatment calculation form.  Based on the prescription, the patient was scheduled for a series of fractional treatments.
Treatment Device Design After Initial Simulation Justification (Will Render If Bill For Treatment Devices = Yes): The patient is status post radiation simulation and is evaluated as to the use of additional devices for shielding and placement for radiation therapy.
Fractions / Week: 2
Dimensions-Y Axis In Cm: 0.4
Computed Treatment Time In Min (Will Render The Same As Calculated Treatment Time If Left Blank): 0.44
Detail Level: Detailed
Field Size (Applicator): 1.5 cm
Total Dose (Optional-Please Include Units): 5467.2 cGy
Additional Prescription Justification Text: If there is any interruption in treatment exceeding 5 days please see Decay and Dose Adjustment Calculation and complete treatment under Prescription 2, 3 or 4 as appropriate.
Simple Simulation Preamble Text Will Be Included With Simple Simulations (.......... Indications): Simple simulation was performed today for the following reasons:
Date Of Fraction 4: 02/11/2025
Treatment Time / Fractionation (Optional- Include Units): 731 cGy/sec
Date Of Fraction 6: 02/18/2025
Patient Positioning: Supine
Date Of Fraction 5: 02/13/2025
Time Dose Fractionation (Optional- Include Units If Applicable): 0.44 min
Total Planned Fractions: 17
Assessment: Appropriate reaction
Energy Output In Cgy/Min (Optional): 731
Fractionation Number (Evaluation): No Treatment Administered
Ultrasound Used Text: Ultrasound was utilized to place radiation therapy fields.
Information: Selecting Yes will display possible errors in your note based on the variables you have selected. This validation is only offered as a suggestion for you. PLEASE NOTE THAT THE VALIDATION TEXT WILL BE REMOVED WHEN YOU FINALIZE YOUR NOTE. IF YOU WANT TO FAX A PRELIMINARY NOTE YOU WILL NEED TO TOGGLE THIS TO 'NO' IF YOU DO NOT WANT IT IN YOUR FAXED NOTE.
Dimensions-X Axis In Cm: 0.3
Other Assessment (Will Overide Current Assessment): Appropriate for SRT
Ultrasound Used Text: Patient has a location which was not amenable to ultrasound.
Custom Shielding Afterword Text Will Not Be Included With Simple Simulations (X X Y Cm............): port to correlate with the lesion size, including treatment margin. The custom lead shield is adequate to accommodate the appropriate applicator and provide adequate shielding around the treatment site. Additional shielding (as noted below) is used to protect sensitive, normal tissues.
Ssd In Cm (Optional): 15
Date Of Fraction 1: 12/18/2024
Custom Shielding Preamble Text Will Not Be Included With Simple Simulations (.......... X X Y Cm): A lead shield of 0.762 mm thickness is utilized to form a molded, custom shield with a
Daily Fractionated Dose (Optional- Include Units): 321.6 cGy
Radiation Units: cGy
Use Automated Fraction Number And Cumulative Dosage?: Yes (Use Enhanced Automation)
Treatment Margins In Cm: 0.5

## 2025-02-20 ENCOUNTER — APPOINTMENT (OUTPATIENT)
Age: 85
Setting detail: DERMATOLOGY
End: 2025-02-20

## 2025-02-20 PROBLEM — C44.311 BASAL CELL CARCINOMA OF SKIN OF NOSE: Status: ACTIVE | Noted: 2025-02-20

## 2025-02-20 PROCEDURE — 77280 THER RAD SIMULAJ FIELD SMPL: CPT

## 2025-02-20 PROCEDURE — ? SUPERFICIAL RADIATION TREATMENT

## 2025-02-20 PROCEDURE — 77401: CPT

## 2025-02-20 NOTE — PROCEDURE: SUPERFICIAL RADIATION TREATMENT
Bill For Simulation: Yes - (Simple Simulation: 51043)
Validate Note Data (See Information Below): No
Field Number: Right Nasal Supratip ([id number])
Intro Statement (Will Not Render If Left Blank): The patient is undergoing superficial radiation therapy for skin cancer and presents for weekly evaluation and management.  Per protocol and as documented on the flow sheet, the patient was questioned as to subjective redness, pruritus, pain, drainage, fatigue, or any other symptoms.  Objectively, the radiation area was evaluated with regards to erythema, atrophy, scale, crusting, erosion, ulceration, edema, purpura, tenderness, warmth, drainage, and any other findings.  The plan was extensively reviewed including the dose, and dosing schedule.  The simulation and clinical setup was also reviewed as was the external and any internal shields and based on this review the appropriateness and sufficiency of treatment was determined.
Total Rx Dosage (Required): 5467.2
Date Of Fraction 2: 12/20/2024
Energy (Optional-Please Include Units): 50 kV
Render Patient Eligibility And Selection In Note?: Yes
Port Dimensions-Y Axis In Cm: 3
Pathology Override (Pathology Will Render As Diagnosis Name If Left Blank): Superficial and Nodular BCC
Please Choose The Type Of Visit (Required): Treatment and Simulation Visit: Show Treatment/Simulation Variables
Fractionation Number Override: 0
Date Of Fraction 3: 01/02/2025
Number Of Days Off Treatment: 1
Functional Status: 0 (fully active)
Daily Dosage Administered For All Fractions (Required): 321.6
Simple Simulation Afterword Text Will Be Included With Simple Simulations (Indications............): The patient had a complete consultation regarding all applicable modalities for the treatment of their skin cancer and based on a variety of factors including the type of tumor, size, and location, the relevant medical history as well as local tissue factors, the functional status of the individual, the ability to perform necessary postoperative wound instructions and the need for simultaneous treatments as well as overall wound healing status, it was determined that the patient would begin radiation therapy treatment for skin cancer.  A full simulation and treatment device design was performed including the determination and formulation of appropriate simple and complex devices including lead shield of 0.762 mm thickness to form molded customized shielding to specifically correlate with the lesion size including treatment margin.  The custom lead shield is adequate to accommodate the appropriate applicator and provide adequate shielding around the treatment site.  The specific field applicator, shields, and devices both simple and complex as well as the specific patient setup is outlined below.  The patient was given a full consent for superficial radiation to both verbally and in writing and the full determination of patient's eligibility for treatment and selection is outlined on the patient eligibility and treatment selection form.  The specific superficial radiotherapy prescription was determined and was documented on the superficial radiotherapy prescription form.  A treatment calculation was also performed and documented on the treatment calculation form.  Based on the prescription, the patient was scheduled for a series of fractional treatments.
Treatment Device Design After Initial Simulation Justification (Will Render If Bill For Treatment Devices = Yes): The patient is status post radiation simulation and is evaluated as to the use of additional devices for shielding and placement for radiation therapy.
Fractions / Week: 2
Dimensions-Y Axis In Cm: 0.4
Computed Treatment Time In Min (Will Render The Same As Calculated Treatment Time If Left Blank): 0.44
Detail Level: Detailed
Field Size (Applicator): 1.5 cm
Total Dose (Optional-Please Include Units): 5467.2 cGy
Additional Prescription Justification Text: If there is any interruption in treatment exceeding 5 days please see Decay and Dose Adjustment Calculation and complete treatment under Prescription 2, 3 or 4 as appropriate.
Simple Simulation Preamble Text Will Be Included With Simple Simulations (.......... Indications): Simple simulation was performed today for the following reasons:
Date Of Fraction 4: 02/11/2025
Treatment Time / Fractionation (Optional- Include Units): 731 cGy/sec
Date Of Fraction 6: 02/18/2025
Patient Positioning: Supine
Date Of Fraction 7: 02/20/2025
Date Of Fraction 5: 02/13/2025
Time Dose Fractionation (Optional- Include Units If Applicable): 0.44 min
Total Planned Fractions: 17
Assessment: Appropriate reaction
Energy Output In Cgy/Min (Optional): 731
Fractionation Number (Evaluation): No Treatment Administered
Ultrasound Used Text: Ultrasound was utilized to place radiation therapy fields.
Information: Selecting Yes will display possible errors in your note based on the variables you have selected. This validation is only offered as a suggestion for you. PLEASE NOTE THAT THE VALIDATION TEXT WILL BE REMOVED WHEN YOU FINALIZE YOUR NOTE. IF YOU WANT TO FAX A PRELIMINARY NOTE YOU WILL NEED TO TOGGLE THIS TO 'NO' IF YOU DO NOT WANT IT IN YOUR FAXED NOTE.
Dimensions-X Axis In Cm: 0.3
Other Assessment (Will Overide Current Assessment): Appropriate for SRT
Ultrasound Used Text: Patient has a location which was not amenable to ultrasound.
Custom Shielding Afterword Text Will Not Be Included With Simple Simulations (X X Y Cm............): port to correlate with the lesion size, including treatment margin. The custom lead shield is adequate to accommodate the appropriate applicator and provide adequate shielding around the treatment site. Additional shielding (as noted below) is used to protect sensitive, normal tissues.
Ssd In Cm (Optional): 15
Date Of Fraction 1: 12/18/2024
Custom Shielding Preamble Text Will Not Be Included With Simple Simulations (.......... X X Y Cm): A lead shield of 0.762 mm thickness is utilized to form a molded, custom shield with a
Daily Fractionated Dose (Optional- Include Units): 321.6 cGy
Radiation Units: cGy
Use Automated Fraction Number And Cumulative Dosage?: Yes (Use Enhanced Automation)
Treatment Margins In Cm: 0.5

## 2025-02-25 ENCOUNTER — APPOINTMENT (OUTPATIENT)
Age: 85
Setting detail: DERMATOLOGY
End: 2025-02-25

## 2025-02-25 PROBLEM — C44.311 BASAL CELL CARCINOMA OF SKIN OF NOSE: Status: ACTIVE | Noted: 2025-02-25

## 2025-02-25 PROCEDURE — 77280 THER RAD SIMULAJ FIELD SMPL: CPT

## 2025-02-25 PROCEDURE — 77401: CPT

## 2025-02-25 PROCEDURE — ? SUPERFICIAL RADIATION TREATMENT

## 2025-02-25 NOTE — PROCEDURE: SUPERFICIAL RADIATION TREATMENT
Bill For Simulation: Yes - (Simple Simulation: 79978)
Validate Note Data (See Information Below): No
Field Number: Right Nasal Supratip ([id number])
Intro Statement (Will Not Render If Left Blank): The patient is undergoing superficial radiation therapy for skin cancer and presents for weekly evaluation and management.  Per protocol and as documented on the flow sheet, the patient was questioned as to subjective redness, pruritus, pain, drainage, fatigue, or any other symptoms.  Objectively, the radiation area was evaluated with regards to erythema, atrophy, scale, crusting, erosion, ulceration, edema, purpura, tenderness, warmth, drainage, and any other findings.  The plan was extensively reviewed including the dose, and dosing schedule.  The simulation and clinical setup was also reviewed as was the external and any internal shields and based on this review the appropriateness and sufficiency of treatment was determined.
Total Rx Dosage (Required): 5467.2
Date Of Fraction 2: 12/20/2024
Energy (Optional-Please Include Units): 50 kV
Render Patient Eligibility And Selection In Note?: Yes
Port Dimensions-Y Axis In Cm: 3
Pathology Override (Pathology Will Render As Diagnosis Name If Left Blank): Superficial and Nodular BCC
Please Choose The Type Of Visit (Required): Treatment and Simulation Visit: Show Treatment/Simulation Variables
Fractionation Number Override: 0
Date Of Fraction 3: 01/02/2025
Number Of Days Off Treatment: 1
Functional Status: 0 (fully active)
Daily Dosage Administered For All Fractions (Required): 321.6
Simple Simulation Afterword Text Will Be Included With Simple Simulations (Indications............): The patient had a complete consultation regarding all applicable modalities for the treatment of their skin cancer and based on a variety of factors including the type of tumor, size, and location, the relevant medical history as well as local tissue factors, the functional status of the individual, the ability to perform necessary postoperative wound instructions and the need for simultaneous treatments as well as overall wound healing status, it was determined that the patient would begin radiation therapy treatment for skin cancer.  A full simulation and treatment device design was performed including the determination and formulation of appropriate simple and complex devices including lead shield of 0.762 mm thickness to form molded customized shielding to specifically correlate with the lesion size including treatment margin.  The custom lead shield is adequate to accommodate the appropriate applicator and provide adequate shielding around the treatment site.  The specific field applicator, shields, and devices both simple and complex as well as the specific patient setup is outlined below.  The patient was given a full consent for superficial radiation to both verbally and in writing and the full determination of patient's eligibility for treatment and selection is outlined on the patient eligibility and treatment selection form.  The specific superficial radiotherapy prescription was determined and was documented on the superficial radiotherapy prescription form.  A treatment calculation was also performed and documented on the treatment calculation form.  Based on the prescription, the patient was scheduled for a series of fractional treatments.
Treatment Device Design After Initial Simulation Justification (Will Render If Bill For Treatment Devices = Yes): The patient is status post radiation simulation and is evaluated as to the use of additional devices for shielding and placement for radiation therapy.
Fractions / Week: 2
Dimensions-Y Axis In Cm: 0.4
Computed Treatment Time In Min (Will Render The Same As Calculated Treatment Time If Left Blank): 0.44
Detail Level: Detailed
Field Size (Applicator): 1.5 cm
Total Dose (Optional-Please Include Units): 5467.2 cGy
Additional Prescription Justification Text: If there is any interruption in treatment exceeding 5 days please see Decay and Dose Adjustment Calculation and complete treatment under Prescription 2, 3 or 4 as appropriate.
Simple Simulation Preamble Text Will Be Included With Simple Simulations (.......... Indications): Simple simulation was performed today for the following reasons:
Date Of Fraction 4: 02/11/2025
Treatment Time / Fractionation (Optional- Include Units): 731 cGy/sec
Date Of Fraction 6: 02/18/2025
Date Of Fraction 8: 02/25/2025
Patient Positioning: Supine
Date Of Fraction 7: 02/20/2025
Date Of Fraction 5: 02/13/2025
Time Dose Fractionation (Optional- Include Units If Applicable): 0.44 min
Total Planned Fractions: 17
Assessment: Appropriate reaction
Energy Output In Cgy/Min (Optional): 731
Fractionation Number (Evaluation): No Treatment Administered
Ultrasound Used Text: Ultrasound was utilized to place radiation therapy fields.
Information: Selecting Yes will display possible errors in your note based on the variables you have selected. This validation is only offered as a suggestion for you. PLEASE NOTE THAT THE VALIDATION TEXT WILL BE REMOVED WHEN YOU FINALIZE YOUR NOTE. IF YOU WANT TO FAX A PRELIMINARY NOTE YOU WILL NEED TO TOGGLE THIS TO 'NO' IF YOU DO NOT WANT IT IN YOUR FAXED NOTE.
Dimensions-X Axis In Cm: 0.3
Other Assessment (Will Overide Current Assessment): Appropriate for SRT
Ultrasound Used Text: Patient has a location which was not amenable to ultrasound.
Custom Shielding Afterword Text Will Not Be Included With Simple Simulations (X X Y Cm............): port to correlate with the lesion size, including treatment margin. The custom lead shield is adequate to accommodate the appropriate applicator and provide adequate shielding around the treatment site. Additional shielding (as noted below) is used to protect sensitive, normal tissues.
Ssd In Cm (Optional): 15
Date Of Fraction 1: 12/18/2024
Custom Shielding Preamble Text Will Not Be Included With Simple Simulations (.......... X X Y Cm): A lead shield of 0.762 mm thickness is utilized to form a molded, custom shield with a
Daily Fractionated Dose (Optional- Include Units): 321.6 cGy
Radiation Units: cGy
Use Automated Fraction Number And Cumulative Dosage?: Yes (Use Enhanced Automation)
Treatment Margins In Cm: 0.5

## 2025-02-27 ENCOUNTER — APPOINTMENT (OUTPATIENT)
Age: 85
Setting detail: DERMATOLOGY
End: 2025-02-27

## 2025-02-27 PROBLEM — C44.311 BASAL CELL CARCINOMA OF SKIN OF NOSE: Status: ACTIVE | Noted: 2025-02-27

## 2025-02-27 PROCEDURE — 77401: CPT

## 2025-02-27 PROCEDURE — 77280 THER RAD SIMULAJ FIELD SMPL: CPT

## 2025-02-27 PROCEDURE — ? SUPERFICIAL RADIATION TREATMENT

## 2025-02-27 NOTE — PROCEDURE: SUPERFICIAL RADIATION TREATMENT
Bill For Simulation: Yes - (Simple Simulation: 20257)
Validate Note Data (See Information Below): No
Field Number: Right Nasal Supratip ([id number])
Intro Statement (Will Not Render If Left Blank): The patient is undergoing superficial radiation therapy for skin cancer and presents for weekly evaluation and management.  Per protocol and as documented on the flow sheet, the patient was questioned as to subjective redness, pruritus, pain, drainage, fatigue, or any other symptoms.  Objectively, the radiation area was evaluated with regards to erythema, atrophy, scale, crusting, erosion, ulceration, edema, purpura, tenderness, warmth, drainage, and any other findings.  The plan was extensively reviewed including the dose, and dosing schedule.  The simulation and clinical setup was also reviewed as was the external and any internal shields and based on this review the appropriateness and sufficiency of treatment was determined.
Total Rx Dosage (Required): 5467.2
Date Of Fraction 2: 12/20/2024
Energy (Optional-Please Include Units): 50 kV
Render Patient Eligibility And Selection In Note?: Yes
Port Dimensions-Y Axis In Cm: 3
Pathology Override (Pathology Will Render As Diagnosis Name If Left Blank): Superficial and Nodular BCC
Please Choose The Type Of Visit (Required): Treatment and Simulation Visit: Show Treatment/Simulation Variables
Fractionation Number Override: 0
Date Of Fraction 3: 01/02/2025
Number Of Days Off Treatment: 1
Functional Status: 0 (fully active)
Daily Dosage Administered For All Fractions (Required): 321.6
Simple Simulation Afterword Text Will Be Included With Simple Simulations (Indications............): The patient had a complete consultation regarding all applicable modalities for the treatment of their skin cancer and based on a variety of factors including the type of tumor, size, and location, the relevant medical history as well as local tissue factors, the functional status of the individual, the ability to perform necessary postoperative wound instructions and the need for simultaneous treatments as well as overall wound healing status, it was determined that the patient would begin radiation therapy treatment for skin cancer.  A full simulation and treatment device design was performed including the determination and formulation of appropriate simple and complex devices including lead shield of 0.762 mm thickness to form molded customized shielding to specifically correlate with the lesion size including treatment margin.  The custom lead shield is adequate to accommodate the appropriate applicator and provide adequate shielding around the treatment site.  The specific field applicator, shields, and devices both simple and complex as well as the specific patient setup is outlined below.  The patient was given a full consent for superficial radiation to both verbally and in writing and the full determination of patient's eligibility for treatment and selection is outlined on the patient eligibility and treatment selection form.  The specific superficial radiotherapy prescription was determined and was documented on the superficial radiotherapy prescription form.  A treatment calculation was also performed and documented on the treatment calculation form.  Based on the prescription, the patient was scheduled for a series of fractional treatments.
Treatment Device Design After Initial Simulation Justification (Will Render If Bill For Treatment Devices = Yes): The patient is status post radiation simulation and is evaluated as to the use of additional devices for shielding and placement for radiation therapy.
Fractions / Week: 2
Dimensions-Y Axis In Cm: 0.4
Computed Treatment Time In Min (Will Render The Same As Calculated Treatment Time If Left Blank): 0.44
Detail Level: Detailed
Field Size (Applicator): 1.5 cm
Total Dose (Optional-Please Include Units): 5467.2 cGy
Additional Prescription Justification Text: If there is any interruption in treatment exceeding 5 days please see Decay and Dose Adjustment Calculation and complete treatment under Prescription 2, 3 or 4 as appropriate.
Simple Simulation Preamble Text Will Be Included With Simple Simulations (.......... Indications): Simple simulation was performed today for the following reasons:
Date Of Fraction 4: 02/11/2025
Treatment Time / Fractionation (Optional- Include Units): 731 cGy/sec
Date Of Fraction 6: 02/18/2025
Date Of Fraction 8: 02/25/2025
Patient Positioning: Supine
Date Of Fraction 7: 02/20/2025
Date Of Fraction 5: 02/13/2025
Time Dose Fractionation (Optional- Include Units If Applicable): 0.44 min
Date Of Fraction 9: 02/27/2025
Total Planned Fractions: 17
Assessment: Appropriate reaction
Energy Output In Cgy/Min (Optional): 731
Fractionation Number (Evaluation): No Treatment Administered
Ultrasound Used Text: Ultrasound was utilized to place radiation therapy fields.
Information: Selecting Yes will display possible errors in your note based on the variables you have selected. This validation is only offered as a suggestion for you. PLEASE NOTE THAT THE VALIDATION TEXT WILL BE REMOVED WHEN YOU FINALIZE YOUR NOTE. IF YOU WANT TO FAX A PRELIMINARY NOTE YOU WILL NEED TO TOGGLE THIS TO 'NO' IF YOU DO NOT WANT IT IN YOUR FAXED NOTE.
Dimensions-X Axis In Cm: 0.3
Other Assessment (Will Overide Current Assessment): Appropriate for SRT
Ultrasound Used Text: Patient has a location which was not amenable to ultrasound.
Custom Shielding Afterword Text Will Not Be Included With Simple Simulations (X X Y Cm............): port to correlate with the lesion size, including treatment margin. The custom lead shield is adequate to accommodate the appropriate applicator and provide adequate shielding around the treatment site. Additional shielding (as noted below) is used to protect sensitive, normal tissues.
Ssd In Cm (Optional): 15
Date Of Fraction 1: 12/18/2024
Custom Shielding Preamble Text Will Not Be Included With Simple Simulations (.......... X X Y Cm): A lead shield of 0.762 mm thickness is utilized to form a molded, custom shield with a
Daily Fractionated Dose (Optional- Include Units): 321.6 cGy
Radiation Units: cGy
Use Automated Fraction Number And Cumulative Dosage?: Yes (Use Enhanced Automation)
Treatment Margins In Cm: 0.5

## 2025-03-04 ENCOUNTER — APPOINTMENT (OUTPATIENT)
Age: 85
Setting detail: DERMATOLOGY
End: 2025-03-04

## 2025-03-04 DIAGNOSIS — Z71.89 OTHER SPECIFIED COUNSELING: ICD-10-CM

## 2025-03-04 PROBLEM — C44.311 BASAL CELL CARCINOMA OF SKIN OF NOSE: Status: ACTIVE | Noted: 2025-03-04

## 2025-03-04 PROCEDURE — ? ADDITIONAL NOTES

## 2025-03-04 PROCEDURE — 77401: CPT

## 2025-03-04 PROCEDURE — ? SUPERFICIAL RADIATION TREATMENT

## 2025-03-04 PROCEDURE — 77280 THER RAD SIMULAJ FIELD SMPL: CPT

## 2025-03-04 NOTE — PROCEDURE: SUPERFICIAL RADIATION TREATMENT
Bill For Simulation: Yes - (Simple Simulation: 09120)
Validate Note Data (See Information Below): No
Field Number: Right Nasal Supratip ([id number])
Intro Statement (Will Not Render If Left Blank): The patient is undergoing superficial radiation therapy for skin cancer and presents for weekly evaluation and management.  Per protocol and as documented on the flow sheet, the patient was questioned as to subjective redness, pruritus, pain, drainage, fatigue, or any other symptoms.  Objectively, the radiation area was evaluated with regards to erythema, atrophy, scale, crusting, erosion, ulceration, edema, purpura, tenderness, warmth, drainage, and any other findings.  The plan was extensively reviewed including the dose, and dosing schedule.  The simulation and clinical setup was also reviewed as was the external and any internal shields and based on this review the appropriateness and sufficiency of treatment was determined.
Total Rx Dosage (Required): 5467.2
Date Of Fraction 2: 12/20/2024
Energy (Optional-Please Include Units): 50 kV
Render Patient Eligibility And Selection In Note?: Yes
Port Dimensions-Y Axis In Cm: 3
Pathology Override (Pathology Will Render As Diagnosis Name If Left Blank): Superficial and Nodular BCC
Please Choose The Type Of Visit (Required): Treatment and Simulation Visit: Show Treatment/Simulation Variables
Fractionation Number Override: 0
Date Of Fraction 3: 01/02/2025
Number Of Days Off Treatment: 1
Functional Status: 0 (fully active)
Daily Dosage Administered For All Fractions (Required): 321.6
Simple Simulation Afterword Text Will Be Included With Simple Simulations (Indications............): The patient had a complete consultation regarding all applicable modalities for the treatment of their skin cancer and based on a variety of factors including the type of tumor, size, and location, the relevant medical history as well as local tissue factors, the functional status of the individual, the ability to perform necessary postoperative wound instructions and the need for simultaneous treatments as well as overall wound healing status, it was determined that the patient would begin radiation therapy treatment for skin cancer.  A full simulation and treatment device design was performed including the determination and formulation of appropriate simple and complex devices including lead shield of 0.762 mm thickness to form molded customized shielding to specifically correlate with the lesion size including treatment margin.  The custom lead shield is adequate to accommodate the appropriate applicator and provide adequate shielding around the treatment site.  The specific field applicator, shields, and devices both simple and complex as well as the specific patient setup is outlined below.  The patient was given a full consent for superficial radiation to both verbally and in writing and the full determination of patient's eligibility for treatment and selection is outlined on the patient eligibility and treatment selection form.  The specific superficial radiotherapy prescription was determined and was documented on the superficial radiotherapy prescription form.  A treatment calculation was also performed and documented on the treatment calculation form.  Based on the prescription, the patient was scheduled for a series of fractional treatments.
Treatment Device Design After Initial Simulation Justification (Will Render If Bill For Treatment Devices = Yes): The patient is status post radiation simulation and is evaluated as to the use of additional devices for shielding and placement for radiation therapy.
Fractions / Week: 2
Dimensions-Y Axis In Cm: 0.4
Computed Treatment Time In Min (Will Render The Same As Calculated Treatment Time If Left Blank): 0.44
Detail Level: Detailed
Field Size (Applicator): 1.5 cm
Total Dose (Optional-Please Include Units): 5467.2 cGy
Additional Prescription Justification Text: If there is any interruption in treatment exceeding 5 days please see Decay and Dose Adjustment Calculation and complete treatment under Prescription 2, 3 or 4 as appropriate.
Simple Simulation Preamble Text Will Be Included With Simple Simulations (.......... Indications): Simple simulation was performed today for the following reasons:
Date Of Fraction 4: 02/11/2025
Treatment Time / Fractionation (Optional- Include Units): 731 cGy/sec
Date Of Fraction 6: 02/18/2025
Date Of Fraction 8: 02/25/2025
Patient Positioning: Supine
Date Of Fraction 7: 02/20/2025
Date Of Fraction 5: 02/13/2025
Time Dose Fractionation (Optional- Include Units If Applicable): 0.44 min
Date Of Fraction 9: 02/27/2025
Total Planned Fractions: 17
Assessment: Appropriate reaction
Energy Output In Cgy/Min (Optional): 731
Fractionation Number (Evaluation): No Treatment Administered
Ultrasound Used Text: Ultrasound was utilized to place radiation therapy fields.
Information: Selecting Yes will display possible errors in your note based on the variables you have selected. This validation is only offered as a suggestion for you. PLEASE NOTE THAT THE VALIDATION TEXT WILL BE REMOVED WHEN YOU FINALIZE YOUR NOTE. IF YOU WANT TO FAX A PRELIMINARY NOTE YOU WILL NEED TO TOGGLE THIS TO 'NO' IF YOU DO NOT WANT IT IN YOUR FAXED NOTE.
Dimensions-X Axis In Cm: 0.3
Other Assessment (Will Overide Current Assessment): Appropriate for SRT
Date Of Fraction 10: 03/04/2025
Ultrasound Used Text: Patient has a location which was not amenable to ultrasound.
Custom Shielding Afterword Text Will Not Be Included With Simple Simulations (X X Y Cm............): port to correlate with the lesion size, including treatment margin. The custom lead shield is adequate to accommodate the appropriate applicator and provide adequate shielding around the treatment site. Additional shielding (as noted below) is used to protect sensitive, normal tissues.
Ssd In Cm (Optional): 15
Date Of Fraction 1: 12/18/2024
Custom Shielding Preamble Text Will Not Be Included With Simple Simulations (.......... X X Y Cm): A lead shield of 0.762 mm thickness is utilized to form a molded, custom shield with a
Daily Fractionated Dose (Optional- Include Units): 321.6 cGy
Radiation Units: cGy
Use Automated Fraction Number And Cumulative Dosage?: Yes (Use Enhanced Automation)
Treatment Margins In Cm: 0.5

## 2025-03-04 NOTE — PROCEDURE: ADDITIONAL NOTES
Render Risk Assessment In Note?: no
Additional Notes: Medicare - I, Dr. Mukul Polo, attest that my ACGME accredited resident/fellow physician at the [facility] residency program and myself personally met with the patient face to face and examined the patient and I developed the plan of care. As a teaching physician which involves residents in providing care at [facility], I am able to provide the necessary direction, management and review for the resident’s services by direct supervision with the resident and the patient. Incident to services are being performed under the direct supervision of Dr. Polo, initiation and continued involvement in treatment is always followed and he is immediately available as he is within the walls of this office during treatment to provide assistance and direction throughout the time the resident or fellow is performing services.
Detail Level: Generalized

## 2025-03-06 ENCOUNTER — APPOINTMENT (OUTPATIENT)
Age: 85
Setting detail: DERMATOLOGY
End: 2025-03-06

## 2025-03-06 DIAGNOSIS — Z71.89 OTHER SPECIFIED COUNSELING: ICD-10-CM

## 2025-03-06 PROBLEM — C44.311 BASAL CELL CARCINOMA OF SKIN OF NOSE: Status: ACTIVE | Noted: 2025-03-06

## 2025-03-06 PROCEDURE — 77401: CPT

## 2025-03-06 PROCEDURE — ? ADDITIONAL NOTES

## 2025-03-06 PROCEDURE — ? SUPERFICIAL RADIATION TREATMENT

## 2025-03-06 PROCEDURE — 77280 THER RAD SIMULAJ FIELD SMPL: CPT

## 2025-03-06 NOTE — PROCEDURE: SUPERFICIAL RADIATION TREATMENT
Bill For Simulation: Yes - (Simple Simulation: 09815)
Validate Note Data (See Information Below): No
Field Number: Right Nasal Supratip ([id number])
Intro Statement (Will Not Render If Left Blank): The patient is undergoing superficial radiation therapy for skin cancer and presents for weekly evaluation and management.  Per protocol and as documented on the flow sheet, the patient was questioned as to subjective redness, pruritus, pain, drainage, fatigue, or any other symptoms.  Objectively, the radiation area was evaluated with regards to erythema, atrophy, scale, crusting, erosion, ulceration, edema, purpura, tenderness, warmth, drainage, and any other findings.  The plan was extensively reviewed including the dose, and dosing schedule.  The simulation and clinical setup was also reviewed as was the external and any internal shields and based on this review the appropriateness and sufficiency of treatment was determined.
Date Of Fraction 11: 03/06/2025
Total Rx Dosage (Required): 5467.2
Date Of Fraction 2: 12/20/2024
Energy (Optional-Please Include Units): 50 kV
Render Patient Eligibility And Selection In Note?: Yes
Port Dimensions-Y Axis In Cm: 3
Pathology Override (Pathology Will Render As Diagnosis Name If Left Blank): Superficial and Nodular BCC
Please Choose The Type Of Visit (Required): Treatment and Simulation Visit: Show Treatment/Simulation Variables
Fractionation Number Override: 0
Date Of Fraction 3: 01/02/2025
Number Of Days Off Treatment: 1
Functional Status: 0 (fully active)
Daily Dosage Administered For All Fractions (Required): 321.6
Simple Simulation Afterword Text Will Be Included With Simple Simulations (Indications............): The patient had a complete consultation regarding all applicable modalities for the treatment of their skin cancer and based on a variety of factors including the type of tumor, size, and location, the relevant medical history as well as local tissue factors, the functional status of the individual, the ability to perform necessary postoperative wound instructions and the need for simultaneous treatments as well as overall wound healing status, it was determined that the patient would begin radiation therapy treatment for skin cancer.  A full simulation and treatment device design was performed including the determination and formulation of appropriate simple and complex devices including lead shield of 0.762 mm thickness to form molded customized shielding to specifically correlate with the lesion size including treatment margin.  The custom lead shield is adequate to accommodate the appropriate applicator and provide adequate shielding around the treatment site.  The specific field applicator, shields, and devices both simple and complex as well as the specific patient setup is outlined below.  The patient was given a full consent for superficial radiation to both verbally and in writing and the full determination of patient's eligibility for treatment and selection is outlined on the patient eligibility and treatment selection form.  The specific superficial radiotherapy prescription was determined and was documented on the superficial radiotherapy prescription form.  A treatment calculation was also performed and documented on the treatment calculation form.  Based on the prescription, the patient was scheduled for a series of fractional treatments.
Treatment Device Design After Initial Simulation Justification (Will Render If Bill For Treatment Devices = Yes): The patient is status post radiation simulation and is evaluated as to the use of additional devices for shielding and placement for radiation therapy.
Fractions / Week: 2
Dimensions-Y Axis In Cm: 0.4
Computed Treatment Time In Min (Will Render The Same As Calculated Treatment Time If Left Blank): 0.44
Detail Level: Detailed
Field Size (Applicator): 1.5 cm
Total Dose (Optional-Please Include Units): 5467.2 cGy
Additional Prescription Justification Text: If there is any interruption in treatment exceeding 5 days please see Decay and Dose Adjustment Calculation and complete treatment under Prescription 2, 3 or 4 as appropriate.
Simple Simulation Preamble Text Will Be Included With Simple Simulations (.......... Indications): Simple simulation was performed today for the following reasons:
Date Of Fraction 4: 02/11/2025
Treatment Time / Fractionation (Optional- Include Units): 731 cGy/sec
Date Of Fraction 6: 02/18/2025
Date Of Fraction 8: 02/25/2025
Patient Positioning: Supine
Date Of Fraction 7: 02/20/2025
Date Of Fraction 5: 02/13/2025
Time Dose Fractionation (Optional- Include Units If Applicable): 0.44 min
Date Of Fraction 9: 02/27/2025
Total Planned Fractions: 17
Assessment: Appropriate reaction
Energy Output In Cgy/Min (Optional): 731
Fractionation Number (Evaluation): No Treatment Administered
Ultrasound Used Text: Ultrasound was utilized to place radiation therapy fields.
Information: Selecting Yes will display possible errors in your note based on the variables you have selected. This validation is only offered as a suggestion for you. PLEASE NOTE THAT THE VALIDATION TEXT WILL BE REMOVED WHEN YOU FINALIZE YOUR NOTE. IF YOU WANT TO FAX A PRELIMINARY NOTE YOU WILL NEED TO TOGGLE THIS TO 'NO' IF YOU DO NOT WANT IT IN YOUR FAXED NOTE.
Dimensions-X Axis In Cm: 0.3
Other Assessment (Will Overide Current Assessment): Appropriate for SRT
Date Of Fraction 10: 03/04/2025
Ultrasound Used Text: Patient has a location which was not amenable to ultrasound.
Custom Shielding Afterword Text Will Not Be Included With Simple Simulations (X X Y Cm............): port to correlate with the lesion size, including treatment margin. The custom lead shield is adequate to accommodate the appropriate applicator and provide adequate shielding around the treatment site. Additional shielding (as noted below) is used to protect sensitive, normal tissues.
Ssd In Cm (Optional): 15
Date Of Fraction 1: 12/18/2024
Custom Shielding Preamble Text Will Not Be Included With Simple Simulations (.......... X X Y Cm): A lead shield of 0.762 mm thickness is utilized to form a molded, custom shield with a
Daily Fractionated Dose (Optional- Include Units): 321.6 cGy
Radiation Units: cGy
Use Automated Fraction Number And Cumulative Dosage?: Yes (Use Enhanced Automation)
Treatment Margins In Cm: 0.5

## 2025-03-06 NOTE — PROCEDURE: ADDITIONAL NOTES
Render Risk Assessment In Note?: no
Additional Notes: Medicare - I, Dr. Mukul Polo, attest that my ACGME accredited resident/fellow physician at the [facility] residency program and myself personally met with the patient face to face and examined the patient and I developed the plan of care. As a teaching physician which involves residents in providing care at [facility], I am able to provide the necessary direction, management and review for the resident’s services by direct supervision with the resident and the patient. Incident to services are being performed under the direct supervision of Dr. Polo, initiation and continued involvement in treatment is always followed and he is immediately available as he is within the walls of this office during treatment to provide assistance and direction throughout the time the resident or fellow is performing services.
Detail Level: Generalized
good balance

## 2025-03-11 ENCOUNTER — APPOINTMENT (OUTPATIENT)
Age: 85
Setting detail: DERMATOLOGY
End: 2025-03-11

## 2025-03-11 DIAGNOSIS — Z71.89 OTHER SPECIFIED COUNSELING: ICD-10-CM

## 2025-03-11 PROBLEM — C44.311 BASAL CELL CARCINOMA OF SKIN OF NOSE: Status: ACTIVE | Noted: 2025-03-11

## 2025-03-11 PROCEDURE — 77401: CPT

## 2025-03-11 PROCEDURE — ? ADDITIONAL NOTES

## 2025-03-11 PROCEDURE — 77280 THER RAD SIMULAJ FIELD SMPL: CPT

## 2025-03-11 PROCEDURE — ? SUPERFICIAL RADIATION TREATMENT

## 2025-03-11 NOTE — PROCEDURE: ADDITIONAL NOTES
Render Risk Assessment In Note?: no
Additional Notes: Medicare - I, Dr. Thuan Miles, attest that my ACGME accredited resident/fellow physician at the [facility] residency program and myself personally met with the patient face to face and examined the patient and I developed the plan of care. As the  and primary teaching physician which involves residents in providing care at [facility], I am able to provide the necessary direction, management and review for the resident’s services by direct supervision with the resident and the patient. Incident to services are being performed under the direct supervision of Dr Miles, initiation and continued involvement in treatment is always followed and he is immediately available as he is within the walls of this office during treatment to provide assistance and direction throughout the time the resident or fellow is performing services.
Detail Level: Simple

## 2025-03-11 NOTE — PROCEDURE: SUPERFICIAL RADIATION TREATMENT
Bill For Simulation: Yes - (Simple Simulation: 07800)
Validate Note Data (See Information Below): No
Field Number: Right Nasal Supratip ([id number])
Intro Statement (Will Not Render If Left Blank): The patient is undergoing superficial radiation therapy for skin cancer and presents for weekly evaluation and management.  Per protocol and as documented on the flow sheet, the patient was questioned as to subjective redness, pruritus, pain, drainage, fatigue, or any other symptoms.  Objectively, the radiation area was evaluated with regards to erythema, atrophy, scale, crusting, erosion, ulceration, edema, purpura, tenderness, warmth, drainage, and any other findings.  The plan was extensively reviewed including the dose, and dosing schedule.  The simulation and clinical setup was also reviewed as was the external and any internal shields and based on this review the appropriateness and sufficiency of treatment was determined.
Date Of Fraction 11: 03/06/2025
Total Rx Dosage (Required): 5467.2
Date Of Fraction 2: 12/20/2024
Energy (Optional-Please Include Units): 50 kV
Render Patient Eligibility And Selection In Note?: Yes
Port Dimensions-Y Axis In Cm: 3
Pathology Override (Pathology Will Render As Diagnosis Name If Left Blank): Superficial and Nodular BCC
Please Choose The Type Of Visit (Required): Treatment and Simulation Visit: Show Treatment/Simulation Variables
Fractionation Number Override: 0
Date Of Fraction 3: 01/02/2025
Number Of Days Off Treatment: 1
Functional Status: 0 (fully active)
Daily Dosage Administered For All Fractions (Required): 321.6
Simple Simulation Afterword Text Will Be Included With Simple Simulations (Indications............): The patient had a complete consultation regarding all applicable modalities for the treatment of their skin cancer and based on a variety of factors including the type of tumor, size, and location, the relevant medical history as well as local tissue factors, the functional status of the individual, the ability to perform necessary postoperative wound instructions and the need for simultaneous treatments as well as overall wound healing status, it was determined that the patient would begin radiation therapy treatment for skin cancer.  A full simulation and treatment device design was performed including the determination and formulation of appropriate simple and complex devices including lead shield of 0.762 mm thickness to form molded customized shielding to specifically correlate with the lesion size including treatment margin.  The custom lead shield is adequate to accommodate the appropriate applicator and provide adequate shielding around the treatment site.  The specific field applicator, shields, and devices both simple and complex as well as the specific patient setup is outlined below.  The patient was given a full consent for superficial radiation to both verbally and in writing and the full determination of patient's eligibility for treatment and selection is outlined on the patient eligibility and treatment selection form.  The specific superficial radiotherapy prescription was determined and was documented on the superficial radiotherapy prescription form.  A treatment calculation was also performed and documented on the treatment calculation form.  Based on the prescription, the patient was scheduled for a series of fractional treatments.
Treatment Device Design After Initial Simulation Justification (Will Render If Bill For Treatment Devices = Yes): The patient is status post radiation simulation and is evaluated as to the use of additional devices for shielding and placement for radiation therapy.
Fractions / Week: 2
Dimensions-Y Axis In Cm: 0.4
Computed Treatment Time In Min (Will Render The Same As Calculated Treatment Time If Left Blank): 0.44
Detail Level: Detailed
Field Size (Applicator): 1.5 cm
Total Dose (Optional-Please Include Units): 5467.2 cGy
Additional Prescription Justification Text: If there is any interruption in treatment exceeding 5 days please see Decay and Dose Adjustment Calculation and complete treatment under Prescription 2, 3 or 4 as appropriate.
Simple Simulation Preamble Text Will Be Included With Simple Simulations (.......... Indications): Simple simulation was performed today for the following reasons:
Date Of Fraction 4: 02/11/2025
Treatment Time / Fractionation (Optional- Include Units): 731 cGy/sec
Date Of Fraction 6: 02/18/2025
Date Of Fraction 8: 02/25/2025
Patient Positioning: Supine
Date Of Fraction 7: 02/20/2025
Date Of Fraction 5: 02/13/2025
Time Dose Fractionation (Optional- Include Units If Applicable): 0.44 min
Date Of Fraction 9: 02/27/2025
Date Of Fraction 12: 03/11/2025
Total Planned Fractions: 17
Assessment: Appropriate reaction
Energy Output In Cgy/Min (Optional): 731
Fractionation Number (Evaluation): No Treatment Administered
Ultrasound Used Text: Ultrasound was utilized to place radiation therapy fields.
Information: Selecting Yes will display possible errors in your note based on the variables you have selected. This validation is only offered as a suggestion for you. PLEASE NOTE THAT THE VALIDATION TEXT WILL BE REMOVED WHEN YOU FINALIZE YOUR NOTE. IF YOU WANT TO FAX A PRELIMINARY NOTE YOU WILL NEED TO TOGGLE THIS TO 'NO' IF YOU DO NOT WANT IT IN YOUR FAXED NOTE.
Dimensions-X Axis In Cm: 0.3
Other Assessment (Will Overide Current Assessment): Appropriate for SRT
Date Of Fraction 10: 03/04/2025
Ultrasound Used Text: Patient has a location which was not amenable to ultrasound.
Custom Shielding Afterword Text Will Not Be Included With Simple Simulations (X X Y Cm............): port to correlate with the lesion size, including treatment margin. The custom lead shield is adequate to accommodate the appropriate applicator and provide adequate shielding around the treatment site. Additional shielding (as noted below) is used to protect sensitive, normal tissues.
Ssd In Cm (Optional): 15
Date Of Fraction 1: 12/18/2024
Custom Shielding Preamble Text Will Not Be Included With Simple Simulations (.......... X X Y Cm): A lead shield of 0.762 mm thickness is utilized to form a molded, custom shield with a
Daily Fractionated Dose (Optional- Include Units): 321.6 cGy
Radiation Units: cGy
Use Automated Fraction Number And Cumulative Dosage?: Yes (Use Enhanced Automation)
Treatment Margins In Cm: 0.5

## 2025-03-13 ENCOUNTER — APPOINTMENT (OUTPATIENT)
Age: 85
Setting detail: DERMATOLOGY
End: 2025-03-13

## 2025-03-13 DIAGNOSIS — Z71.89 OTHER SPECIFIED COUNSELING: ICD-10-CM

## 2025-03-13 PROBLEM — C44.311 BASAL CELL CARCINOMA OF SKIN OF NOSE: Status: ACTIVE | Noted: 2025-03-13

## 2025-03-13 PROCEDURE — 77401: CPT

## 2025-03-13 PROCEDURE — 77280 THER RAD SIMULAJ FIELD SMPL: CPT

## 2025-03-13 PROCEDURE — ? ADDITIONAL NOTES

## 2025-03-13 PROCEDURE — ? SUPERFICIAL RADIATION TREATMENT

## 2025-03-13 NOTE — PROCEDURE: ADDITIONAL NOTES
Render Risk Assessment In Note?: no
Additional Notes: Medicare - I, Dr. Thuan Miles, attest that my ACGME accredited resident/fellow physician at the [facility] residency program and myself personally met with the patient face to face and examined the patient and I developed the plan of care. As the  and primary teaching physician which involves residents in providing care at [facility], I am able to provide the necessary direction, management and review for the resident’s services by direct supervision with the resident and the patient. Incident to services are being performed under the direct supervision of Dr Miles, initiation and continued involvement in treatment is always followed and he is immediately available as he is within the walls of this office during treatment to provide assistance and direction throughout the time the resident or fellow is performing services.
Detail Level: Simple

## 2025-03-13 NOTE — PROCEDURE: SUPERFICIAL RADIATION TREATMENT
Bill For Simulation: Yes - (Simple Simulation: 41915)
Validate Note Data (See Information Below): No
Field Number: Right Nasal Supratip ([id number])
Intro Statement (Will Not Render If Left Blank): The patient is undergoing superficial radiation therapy for skin cancer and presents for weekly evaluation and management.  Per protocol and as documented on the flow sheet, the patient was questioned as to subjective redness, pruritus, pain, drainage, fatigue, or any other symptoms.  Objectively, the radiation area was evaluated with regards to erythema, atrophy, scale, crusting, erosion, ulceration, edema, purpura, tenderness, warmth, drainage, and any other findings.  The plan was extensively reviewed including the dose, and dosing schedule.  The simulation and clinical setup was also reviewed as was the external and any internal shields and based on this review the appropriateness and sufficiency of treatment was determined.
Date Of Fraction 11: 03/06/2025
Total Rx Dosage (Required): 5467.2
Date Of Fraction 2: 12/20/2024
Energy (Optional-Please Include Units): 50 kV
Render Patient Eligibility And Selection In Note?: Yes
Port Dimensions-Y Axis In Cm: 3
Pathology Override (Pathology Will Render As Diagnosis Name If Left Blank): Superficial and Nodular BCC
Please Choose The Type Of Visit (Required): Treatment and Simulation Visit: Show Treatment/Simulation Variables
Fractionation Number Override: 0
Date Of Fraction 3: 01/02/2025
Number Of Days Off Treatment: 1
Functional Status: 0 (fully active)
Daily Dosage Administered For All Fractions (Required): 321.6
Simple Simulation Afterword Text Will Be Included With Simple Simulations (Indications............): The patient had a complete consultation regarding all applicable modalities for the treatment of their skin cancer and based on a variety of factors including the type of tumor, size, and location, the relevant medical history as well as local tissue factors, the functional status of the individual, the ability to perform necessary postoperative wound instructions and the need for simultaneous treatments as well as overall wound healing status, it was determined that the patient would begin radiation therapy treatment for skin cancer.  A full simulation and treatment device design was performed including the determination and formulation of appropriate simple and complex devices including lead shield of 0.762 mm thickness to form molded customized shielding to specifically correlate with the lesion size including treatment margin.  The custom lead shield is adequate to accommodate the appropriate applicator and provide adequate shielding around the treatment site.  The specific field applicator, shields, and devices both simple and complex as well as the specific patient setup is outlined below.  The patient was given a full consent for superficial radiation to both verbally and in writing and the full determination of patient's eligibility for treatment and selection is outlined on the patient eligibility and treatment selection form.  The specific superficial radiotherapy prescription was determined and was documented on the superficial radiotherapy prescription form.  A treatment calculation was also performed and documented on the treatment calculation form.  Based on the prescription, the patient was scheduled for a series of fractional treatments.
Treatment Device Design After Initial Simulation Justification (Will Render If Bill For Treatment Devices = Yes): The patient is status post radiation simulation and is evaluated as to the use of additional devices for shielding and placement for radiation therapy.
Fractions / Week: 2
Dimensions-Y Axis In Cm: 0.4
Computed Treatment Time In Min (Will Render The Same As Calculated Treatment Time If Left Blank): 0.44
Detail Level: Detailed
Field Size (Applicator): 1.5 cm
Total Dose (Optional-Please Include Units): 5467.2 cGy
Additional Prescription Justification Text: If there is any interruption in treatment exceeding 5 days please see Decay and Dose Adjustment Calculation and complete treatment under Prescription 2, 3 or 4 as appropriate.
Simple Simulation Preamble Text Will Be Included With Simple Simulations (.......... Indications): Simple simulation was performed today for the following reasons:
Date Of Fraction 4: 02/11/2025
Treatment Time / Fractionation (Optional- Include Units): 731 cGy/sec
Date Of Fraction 6: 02/18/2025
Date Of Fraction 8: 02/25/2025
Patient Positioning: Supine
Date Of Fraction 7: 02/20/2025
Date Of Fraction 5: 02/13/2025
Time Dose Fractionation (Optional- Include Units If Applicable): 0.44 min
Date Of Fraction 9: 02/27/2025
Date Of Fraction 12: 03/11/2025
Date Of Fraction 13: 03/13/2025
Total Planned Fractions: 17
Assessment: Appropriate reaction
Energy Output In Cgy/Min (Optional): 731
Fractionation Number (Evaluation): No Treatment Administered
Ultrasound Used Text: Ultrasound was utilized to place radiation therapy fields.
Information: Selecting Yes will display possible errors in your note based on the variables you have selected. This validation is only offered as a suggestion for you. PLEASE NOTE THAT THE VALIDATION TEXT WILL BE REMOVED WHEN YOU FINALIZE YOUR NOTE. IF YOU WANT TO FAX A PRELIMINARY NOTE YOU WILL NEED TO TOGGLE THIS TO 'NO' IF YOU DO NOT WANT IT IN YOUR FAXED NOTE.
Dimensions-X Axis In Cm: 0.3
Other Assessment (Will Overide Current Assessment): Appropriate for SRT
Date Of Fraction 10: 03/04/2025
Ultrasound Used Text: Patient has a location which was not amenable to ultrasound.
Custom Shielding Afterword Text Will Not Be Included With Simple Simulations (X X Y Cm............): port to correlate with the lesion size, including treatment margin. The custom lead shield is adequate to accommodate the appropriate applicator and provide adequate shielding around the treatment site. Additional shielding (as noted below) is used to protect sensitive, normal tissues.
Ssd In Cm (Optional): 15
Date Of Fraction 1: 12/18/2024
Custom Shielding Preamble Text Will Not Be Included With Simple Simulations (.......... X X Y Cm): A lead shield of 0.762 mm thickness is utilized to form a molded, custom shield with a
Daily Fractionated Dose (Optional- Include Units): 321.6 cGy
Radiation Units: cGy
Use Automated Fraction Number And Cumulative Dosage?: Yes (Use Enhanced Automation)
Treatment Margins In Cm: 0.5

## 2025-03-17 ENCOUNTER — APPOINTMENT (OUTPATIENT)
Age: 85
Setting detail: DERMATOLOGY
End: 2025-03-17

## 2025-03-17 DIAGNOSIS — Z71.89 OTHER SPECIFIED COUNSELING: ICD-10-CM

## 2025-03-17 PROBLEM — C44.311 BASAL CELL CARCINOMA OF SKIN OF NOSE: Status: ACTIVE | Noted: 2025-03-17

## 2025-03-17 PROCEDURE — 77401: CPT

## 2025-03-17 PROCEDURE — ? ADDITIONAL NOTES

## 2025-03-17 PROCEDURE — ? SUPERFICIAL RADIATION TREATMENT

## 2025-03-17 PROCEDURE — 77280 THER RAD SIMULAJ FIELD SMPL: CPT

## 2025-03-17 NOTE — PROCEDURE: ADDITIONAL NOTES
Render Risk Assessment In Note?: no
Additional Notes: Medicare - I, Dr. Thuan Miles, attest that my ACGME accredited resident/fellow physician at the [facility] residency program and myself personally met with the patient face to face and examined the patient and I developed the plan of care. As the  and primary teaching physician which involves residents in providing care at [facility], I am able to provide the necessary direction, management and review for the resident’s services by direct supervision with the resident and the patient. Incident to services are being performed under the direct supervision of Dr Miles, initiation and continued involvement in treatment is always followed and he is immediately available as he is within the walls of this office during treatment to provide assistance and direction throughout the time the resident or fellow is performing services.
Detail Level: Simple

## 2025-03-17 NOTE — PROCEDURE: SUPERFICIAL RADIATION TREATMENT
Bill For Simulation: Yes - (Simple Simulation: 90167)
Validate Note Data (See Information Below): No
Field Number: Right Nasal Supratip ([id number])
Intro Statement (Will Not Render If Left Blank): The patient is undergoing superficial radiation therapy for skin cancer and presents for weekly evaluation and management.  Per protocol and as documented on the flow sheet, the patient was questioned as to subjective redness, pruritus, pain, drainage, fatigue, or any other symptoms.  Objectively, the radiation area was evaluated with regards to erythema, atrophy, scale, crusting, erosion, ulceration, edema, purpura, tenderness, warmth, drainage, and any other findings.  The plan was extensively reviewed including the dose, and dosing schedule.  The simulation and clinical setup was also reviewed as was the external and any internal shields and based on this review the appropriateness and sufficiency of treatment was determined.
Date Of Fraction 11: 03/06/2025
Total Rx Dosage (Required): 5467.2
Date Of Fraction 2: 12/20/2024
Energy (Optional-Please Include Units): 50 kV
Render Patient Eligibility And Selection In Note?: Yes
Port Dimensions-Y Axis In Cm: 3
Pathology Override (Pathology Will Render As Diagnosis Name If Left Blank): Superficial and Nodular BCC
Please Choose The Type Of Visit (Required): Treatment and Simulation Visit: Show Treatment/Simulation Variables
Fractionation Number Override: 0
Date Of Fraction 3: 01/02/2025
Number Of Days Off Treatment: 1
Functional Status: 0 (fully active)
Daily Dosage Administered For All Fractions (Required): 321.6
Simple Simulation Afterword Text Will Be Included With Simple Simulations (Indications............): The patient had a complete consultation regarding all applicable modalities for the treatment of their skin cancer and based on a variety of factors including the type of tumor, size, and location, the relevant medical history as well as local tissue factors, the functional status of the individual, the ability to perform necessary postoperative wound instructions and the need for simultaneous treatments as well as overall wound healing status, it was determined that the patient would begin radiation therapy treatment for skin cancer.  A full simulation and treatment device design was performed including the determination and formulation of appropriate simple and complex devices including lead shield of 0.762 mm thickness to form molded customized shielding to specifically correlate with the lesion size including treatment margin.  The custom lead shield is adequate to accommodate the appropriate applicator and provide adequate shielding around the treatment site.  The specific field applicator, shields, and devices both simple and complex as well as the specific patient setup is outlined below.  The patient was given a full consent for superficial radiation to both verbally and in writing and the full determination of patient's eligibility for treatment and selection is outlined on the patient eligibility and treatment selection form.  The specific superficial radiotherapy prescription was determined and was documented on the superficial radiotherapy prescription form.  A treatment calculation was also performed and documented on the treatment calculation form.  Based on the prescription, the patient was scheduled for a series of fractional treatments.
Treatment Device Design After Initial Simulation Justification (Will Render If Bill For Treatment Devices = Yes): The patient is status post radiation simulation and is evaluated as to the use of additional devices for shielding and placement for radiation therapy.
Fractions / Week: 2
Dimensions-Y Axis In Cm: 0.4
Computed Treatment Time In Min (Will Render The Same As Calculated Treatment Time If Left Blank): 0.44
Detail Level: Detailed
Field Size (Applicator): 1.5 cm
Total Dose (Optional-Please Include Units): 5467.2 cGy
Additional Prescription Justification Text: If there is any interruption in treatment exceeding 5 days please see Decay and Dose Adjustment Calculation and complete treatment under Prescription 2, 3 or 4 as appropriate.
Simple Simulation Preamble Text Will Be Included With Simple Simulations (.......... Indications): Simple simulation was performed today for the following reasons:
Date Of Fraction 4: 02/11/2025
Treatment Time / Fractionation (Optional- Include Units): 731 cGy/sec
Date Of Fraction 6: 02/18/2025
Date Of Fraction 8: 02/25/2025
Patient Positioning: Supine
Date Of Fraction 7: 02/20/2025
Date Of Fraction 5: 02/13/2025
Time Dose Fractionation (Optional- Include Units If Applicable): 0.44 min
Date Of Fraction 9: 02/27/2025
Date Of Fraction 12: 03/11/2025
Date Of Fraction 13: 03/13/2025
Total Planned Fractions: 17
Assessment: Appropriate reaction
Energy Output In Cgy/Min (Optional): 731
Fractionation Number (Evaluation): No Treatment Administered
Ultrasound Used Text: Ultrasound was utilized to place radiation therapy fields.
Information: Selecting Yes will display possible errors in your note based on the variables you have selected. This validation is only offered as a suggestion for you. PLEASE NOTE THAT THE VALIDATION TEXT WILL BE REMOVED WHEN YOU FINALIZE YOUR NOTE. IF YOU WANT TO FAX A PRELIMINARY NOTE YOU WILL NEED TO TOGGLE THIS TO 'NO' IF YOU DO NOT WANT IT IN YOUR FAXED NOTE.
Dimensions-X Axis In Cm: 0.3
Other Assessment (Will Overide Current Assessment): Appropriate for SRT
Date Of Fraction 10: 03/04/2025
Ultrasound Used Text: Patient has a location which was not amenable to ultrasound.
Custom Shielding Afterword Text Will Not Be Included With Simple Simulations (X X Y Cm............): port to correlate with the lesion size, including treatment margin. The custom lead shield is adequate to accommodate the appropriate applicator and provide adequate shielding around the treatment site. Additional shielding (as noted below) is used to protect sensitive, normal tissues.
Ssd In Cm (Optional): 15
Date Of Fraction 1: 12/18/2024
Custom Shielding Preamble Text Will Not Be Included With Simple Simulations (.......... X X Y Cm): A lead shield of 0.762 mm thickness is utilized to form a molded, custom shield with a
Daily Fractionated Dose (Optional- Include Units): 321.6 cGy
Radiation Units: cGy
Use Automated Fraction Number And Cumulative Dosage?: Yes (Use Enhanced Automation)
Treatment Margins In Cm: 0.5
Date Of Fraction 14: 03/17/2025

## 2025-03-21 ENCOUNTER — APPOINTMENT (OUTPATIENT)
Age: 85
Setting detail: DERMATOLOGY
End: 2025-03-21

## 2025-03-21 DIAGNOSIS — Z71.89 OTHER SPECIFIED COUNSELING: ICD-10-CM

## 2025-03-21 PROBLEM — C44.311 BASAL CELL CARCINOMA OF SKIN OF NOSE: Status: ACTIVE | Noted: 2025-03-21

## 2025-03-21 PROCEDURE — ? ADDITIONAL NOTES

## 2025-03-21 PROCEDURE — ? SUPERFICIAL RADIATION TREATMENT

## 2025-03-21 PROCEDURE — 77401: CPT

## 2025-03-21 PROCEDURE — 77280 THER RAD SIMULAJ FIELD SMPL: CPT

## 2025-03-21 NOTE — PROCEDURE: ADDITIONAL NOTES
Render Risk Assessment In Note?: no
Additional Notes: Medicare - I, Dr. Thuan Miles, attest that my ACGME accredited resident/fellow physician at the [facility] residency program and myself personally met with the patient face to face and examined the patient and I developed the plan of care. As the  and primary teaching physician which involves residents in providing care at [facility], I am able to provide the necessary direction, management and review for the resident’s services by direct supervision with the resident and the patient. Incident to services are being performed under the direct supervision of Dr Miles, initiation and continued involvement in treatment is always followed and he is immediately available as he is within the walls of this office during treatment to provide assistance and direction throughout the time the resident or fellow is performing services.
Detail Level: Simple

## 2025-03-21 NOTE — PROCEDURE: SUPERFICIAL RADIATION TREATMENT
Bill For Simulation: Yes - (Simple Simulation: 40322)
Validate Note Data (See Information Below): No
Field Number: Right Nasal Supratip ([id number])
Intro Statement (Will Not Render If Left Blank): The patient is undergoing superficial radiation therapy for skin cancer and presents for weekly evaluation and management.  Per protocol and as documented on the flow sheet, the patient was questioned as to subjective redness, pruritus, pain, drainage, fatigue, or any other symptoms.  Objectively, the radiation area was evaluated with regards to erythema, atrophy, scale, crusting, erosion, ulceration, edema, purpura, tenderness, warmth, drainage, and any other findings.  The plan was extensively reviewed including the dose, and dosing schedule.  The simulation and clinical setup was also reviewed as was the external and any internal shields and based on this review the appropriateness and sufficiency of treatment was determined.
Date Of Fraction 11: 03/06/2025
Total Rx Dosage (Required): 5467.2
Date Of Fraction 2: 12/20/2024
Date Of Fraction 15: 03/21/2025
Energy (Optional-Please Include Units): 50 kV
Render Patient Eligibility And Selection In Note?: Yes
Port Dimensions-Y Axis In Cm: 3
Pathology Override (Pathology Will Render As Diagnosis Name If Left Blank): Superficial and Nodular BCC
Please Choose The Type Of Visit (Required): Treatment and Simulation Visit: Show Treatment/Simulation Variables
Fractionation Number Override: 0
Date Of Fraction 3: 01/02/2025
Number Of Days Off Treatment: 1
Functional Status: 0 (fully active)
Daily Dosage Administered For All Fractions (Required): 321.6
Simple Simulation Afterword Text Will Be Included With Simple Simulations (Indications............): The patient had a complete consultation regarding all applicable modalities for the treatment of their skin cancer and based on a variety of factors including the type of tumor, size, and location, the relevant medical history as well as local tissue factors, the functional status of the individual, the ability to perform necessary postoperative wound instructions and the need for simultaneous treatments as well as overall wound healing status, it was determined that the patient would begin radiation therapy treatment for skin cancer.  A full simulation and treatment device design was performed including the determination and formulation of appropriate simple and complex devices including lead shield of 0.762 mm thickness to form molded customized shielding to specifically correlate with the lesion size including treatment margin.  The custom lead shield is adequate to accommodate the appropriate applicator and provide adequate shielding around the treatment site.  The specific field applicator, shields, and devices both simple and complex as well as the specific patient setup is outlined below.  The patient was given a full consent for superficial radiation to both verbally and in writing and the full determination of patient's eligibility for treatment and selection is outlined on the patient eligibility and treatment selection form.  The specific superficial radiotherapy prescription was determined and was documented on the superficial radiotherapy prescription form.  A treatment calculation was also performed and documented on the treatment calculation form.  Based on the prescription, the patient was scheduled for a series of fractional treatments.
Treatment Device Design After Initial Simulation Justification (Will Render If Bill For Treatment Devices = Yes): The patient is status post radiation simulation and is evaluated as to the use of additional devices for shielding and placement for radiation therapy.
Fractions / Week: 2
Dimensions-Y Axis In Cm: 0.4
Computed Treatment Time In Min (Will Render The Same As Calculated Treatment Time If Left Blank): 0.44
Detail Level: Detailed
Field Size (Applicator): 1.5 cm
Total Dose (Optional-Please Include Units): 5467.2 cGy
Additional Prescription Justification Text: If there is any interruption in treatment exceeding 5 days please see Decay and Dose Adjustment Calculation and complete treatment under Prescription 2, 3 or 4 as appropriate.
Simple Simulation Preamble Text Will Be Included With Simple Simulations (.......... Indications): Simple simulation was performed today for the following reasons:
Date Of Fraction 4: 02/11/2025
Treatment Time / Fractionation (Optional- Include Units): 731 cGy/sec
Date Of Fraction 6: 02/18/2025
Date Of Fraction 8: 02/25/2025
Patient Positioning: Supine
Date Of Fraction 7: 02/20/2025
Date Of Fraction 5: 02/13/2025
Time Dose Fractionation (Optional- Include Units If Applicable): 0.44 min
Date Of Fraction 9: 02/27/2025
Date Of Fraction 12: 03/11/2025
Date Of Fraction 13: 03/13/2025
Total Planned Fractions: 17
Assessment: Appropriate reaction
Energy Output In Cgy/Min (Optional): 731
Fractionation Number (Evaluation): No Treatment Administered
Ultrasound Used Text: Ultrasound was utilized to place radiation therapy fields.
Information: Selecting Yes will display possible errors in your note based on the variables you have selected. This validation is only offered as a suggestion for you. PLEASE NOTE THAT THE VALIDATION TEXT WILL BE REMOVED WHEN YOU FINALIZE YOUR NOTE. IF YOU WANT TO FAX A PRELIMINARY NOTE YOU WILL NEED TO TOGGLE THIS TO 'NO' IF YOU DO NOT WANT IT IN YOUR FAXED NOTE.
Dimensions-X Axis In Cm: 0.3
Other Assessment (Will Overide Current Assessment): Appropriate for SRT
Date Of Fraction 10: 03/04/2025
Ultrasound Used Text: Patient has a location which was not amenable to ultrasound.
Custom Shielding Afterword Text Will Not Be Included With Simple Simulations (X X Y Cm............): port to correlate with the lesion size, including treatment margin. The custom lead shield is adequate to accommodate the appropriate applicator and provide adequate shielding around the treatment site. Additional shielding (as noted below) is used to protect sensitive, normal tissues.
Ssd In Cm (Optional): 15
Date Of Fraction 1: 12/18/2024
Custom Shielding Preamble Text Will Not Be Included With Simple Simulations (.......... X X Y Cm): A lead shield of 0.762 mm thickness is utilized to form a molded, custom shield with a
Daily Fractionated Dose (Optional- Include Units): 321.6 cGy
Radiation Units: cGy
Use Automated Fraction Number And Cumulative Dosage?: Yes (Use Enhanced Automation)
Treatment Margins In Cm: 0.5
Date Of Fraction 14: 03/17/2025

## 2025-03-24 ENCOUNTER — APPOINTMENT (OUTPATIENT)
Age: 85
Setting detail: DERMATOLOGY
End: 2025-03-24

## 2025-03-24 DIAGNOSIS — Z71.89 OTHER SPECIFIED COUNSELING: ICD-10-CM

## 2025-03-24 PROBLEM — C44.311 BASAL CELL CARCINOMA OF SKIN OF NOSE: Status: ACTIVE | Noted: 2025-03-24

## 2025-03-24 PROCEDURE — ? ADDITIONAL NOTES

## 2025-03-24 PROCEDURE — 77401: CPT

## 2025-03-24 PROCEDURE — ? SUPERFICIAL RADIATION TREATMENT

## 2025-03-24 PROCEDURE — 77280 THER RAD SIMULAJ FIELD SMPL: CPT

## 2025-03-24 NOTE — PROCEDURE: ADDITIONAL NOTES
Render Risk Assessment In Note?: no
Additional Notes: Medicare - I, Dr. Thuan Miles, attest that my ACGME accredited resident/fellow physician at the [facility] residency program and myself personally met with the patient face to face and examined the patient and I developed the plan of care. As the  and primary teaching physician which involves residents in providing care at [facility], I am able to provide the necessary direction, management and review for the resident’s services by direct supervision with the resident and the patient. Incident to services are being performed under the direct supervision of Dr Miles, initiation and continued involvement in treatment is always followed and he is immediately available as he is within the walls of this office during treatment to provide assistance and direction throughout the time the resident or fellow is performing services.
Detail Level: Simple

## 2025-03-24 NOTE — PROCEDURE: SUPERFICIAL RADIATION TREATMENT
Bill For Simulation: Yes - (Simple Simulation: 10726)
Validate Note Data (See Information Below): No
Field Number: Right Nasal Supratip ([id number])
Intro Statement (Will Not Render If Left Blank): The patient is undergoing superficial radiation therapy for skin cancer and presents for weekly evaluation and management.  Per protocol and as documented on the flow sheet, the patient was questioned as to subjective redness, pruritus, pain, drainage, fatigue, or any other symptoms.  Objectively, the radiation area was evaluated with regards to erythema, atrophy, scale, crusting, erosion, ulceration, edema, purpura, tenderness, warmth, drainage, and any other findings.  The plan was extensively reviewed including the dose, and dosing schedule.  The simulation and clinical setup was also reviewed as was the external and any internal shields and based on this review the appropriateness and sufficiency of treatment was determined.
Date Of Fraction 11: 03/06/2025
Total Rx Dosage (Required): 5467.2
Date Of Fraction 2: 12/20/2024
Date Of Fraction 15: 03/21/2025
Energy (Optional-Please Include Units): 50 kV
Render Patient Eligibility And Selection In Note?: Yes
Port Dimensions-Y Axis In Cm: 3
Pathology Override (Pathology Will Render As Diagnosis Name If Left Blank): Superficial and Nodular BCC
Please Choose The Type Of Visit (Required): Treatment and Simulation Visit: Show Treatment/Simulation Variables
Fractionation Number Override: 0
Date Of Fraction 3: 01/02/2025
Number Of Days Off Treatment: 1
Functional Status: 0 (fully active)
Daily Dosage Administered For All Fractions (Required): 321.6
Simple Simulation Afterword Text Will Be Included With Simple Simulations (Indications............): The patient had a complete consultation regarding all applicable modalities for the treatment of their skin cancer and based on a variety of factors including the type of tumor, size, and location, the relevant medical history as well as local tissue factors, the functional status of the individual, the ability to perform necessary postoperative wound instructions and the need for simultaneous treatments as well as overall wound healing status, it was determined that the patient would begin radiation therapy treatment for skin cancer.  A full simulation and treatment device design was performed including the determination and formulation of appropriate simple and complex devices including lead shield of 0.762 mm thickness to form molded customized shielding to specifically correlate with the lesion size including treatment margin.  The custom lead shield is adequate to accommodate the appropriate applicator and provide adequate shielding around the treatment site.  The specific field applicator, shields, and devices both simple and complex as well as the specific patient setup is outlined below.  The patient was given a full consent for superficial radiation to both verbally and in writing and the full determination of patient's eligibility for treatment and selection is outlined on the patient eligibility and treatment selection form.  The specific superficial radiotherapy prescription was determined and was documented on the superficial radiotherapy prescription form.  A treatment calculation was also performed and documented on the treatment calculation form.  Based on the prescription, the patient was scheduled for a series of fractional treatments.
Treatment Device Design After Initial Simulation Justification (Will Render If Bill For Treatment Devices = Yes): The patient is status post radiation simulation and is evaluated as to the use of additional devices for shielding and placement for radiation therapy.
Fractions / Week: 2
Dimensions-Y Axis In Cm: 0.4
Computed Treatment Time In Min (Will Render The Same As Calculated Treatment Time If Left Blank): 0.44
Detail Level: Detailed
Field Size (Applicator): 1.5 cm
Total Dose (Optional-Please Include Units): 5467.2 cGy
Additional Prescription Justification Text: If there is any interruption in treatment exceeding 5 days please see Decay and Dose Adjustment Calculation and complete treatment under Prescription 2, 3 or 4 as appropriate.
Simple Simulation Preamble Text Will Be Included With Simple Simulations (.......... Indications): Simple simulation was performed today for the following reasons:
Date Of Fraction 4: 02/11/2025
Treatment Time / Fractionation (Optional- Include Units): 731 cGy/sec
Date Of Fraction 6: 02/18/2025
Date Of Fraction 8: 02/25/2025
Patient Positioning: Supine
Date Of Fraction 7: 02/20/2025
Date Of Fraction 5: 02/13/2025
Time Dose Fractionation (Optional- Include Units If Applicable): 0.44 min
Date Of Fraction 9: 02/27/2025
Date Of Fraction 12: 03/11/2025
Date Of Fraction 13: 03/13/2025
Total Planned Fractions: 17
Assessment: Appropriate reaction
Energy Output In Cgy/Min (Optional): 731
Fractionation Number (Evaluation): No Treatment Administered
Ultrasound Used Text: Ultrasound was utilized to place radiation therapy fields.
Information: Selecting Yes will display possible errors in your note based on the variables you have selected. This validation is only offered as a suggestion for you. PLEASE NOTE THAT THE VALIDATION TEXT WILL BE REMOVED WHEN YOU FINALIZE YOUR NOTE. IF YOU WANT TO FAX A PRELIMINARY NOTE YOU WILL NEED TO TOGGLE THIS TO 'NO' IF YOU DO NOT WANT IT IN YOUR FAXED NOTE.
Dimensions-X Axis In Cm: 0.3
Other Assessment (Will Overide Current Assessment): Appropriate for SRT
Date Of Fraction 10: 03/04/2025
Ultrasound Used Text: Patient has a location which was not amenable to ultrasound.
Custom Shielding Afterword Text Will Not Be Included With Simple Simulations (X X Y Cm............): port to correlate with the lesion size, including treatment margin. The custom lead shield is adequate to accommodate the appropriate applicator and provide adequate shielding around the treatment site. Additional shielding (as noted below) is used to protect sensitive, normal tissues.
Ssd In Cm (Optional): 15
Date Of Fraction 1: 12/18/2024
Custom Shielding Preamble Text Will Not Be Included With Simple Simulations (.......... X X Y Cm): A lead shield of 0.762 mm thickness is utilized to form a molded, custom shield with a
Daily Fractionated Dose (Optional- Include Units): 321.6 cGy
Radiation Units: cGy
Use Automated Fraction Number And Cumulative Dosage?: Yes (Use Enhanced Automation)
Treatment Margins In Cm: 0.5
Date Of Fraction 14: 03/17/2025
Date Of Fraction 16: 03/24/2025

## 2025-03-26 ENCOUNTER — APPOINTMENT (OUTPATIENT)
Age: 85
Setting detail: DERMATOLOGY
End: 2025-03-26

## 2025-03-26 DIAGNOSIS — Z71.89 OTHER SPECIFIED COUNSELING: ICD-10-CM

## 2025-03-26 PROBLEM — C44.311 BASAL CELL CARCINOMA OF SKIN OF NOSE: Status: ACTIVE | Noted: 2025-03-26

## 2025-03-26 PROCEDURE — ? SUPERFICIAL RADIATION TREATMENT

## 2025-03-26 PROCEDURE — ? ADDITIONAL NOTES

## 2025-03-26 PROCEDURE — 77401: CPT

## 2025-03-26 PROCEDURE — 77280 THER RAD SIMULAJ FIELD SMPL: CPT

## 2025-03-26 NOTE — PROCEDURE: SUPERFICIAL RADIATION TREATMENT
Bill For Simulation: Yes - (Simple Simulation: 58297)
Validate Note Data (See Information Below): No
Field Number: Right Nasal Supratip ([id number])
Intro Statement (Will Not Render If Left Blank): The patient is undergoing superficial radiation therapy for skin cancer and presents for weekly evaluation and management.  Per protocol and as documented on the flow sheet, the patient was questioned as to subjective redness, pruritus, pain, drainage, fatigue, or any other symptoms.  Objectively, the radiation area was evaluated with regards to erythema, atrophy, scale, crusting, erosion, ulceration, edema, purpura, tenderness, warmth, drainage, and any other findings.  The plan was extensively reviewed including the dose, and dosing schedule.  The simulation and clinical setup was also reviewed as was the external and any internal shields and based on this review the appropriateness and sufficiency of treatment was determined.
Date Of Fraction 11: 03/06/2025
Total Rx Dosage (Required): 5467.2
Date Of Fraction 2: 12/20/2024
Date Of Fraction 15: 03/21/2025
Energy (Optional-Please Include Units): 50 kV
Render Patient Eligibility And Selection In Note?: Yes
Port Dimensions-Y Axis In Cm: 3
Pathology Override (Pathology Will Render As Diagnosis Name If Left Blank): Superficial and Nodular BCC
Please Choose The Type Of Visit (Required): Treatment and Simulation Visit: Show Treatment/Simulation Variables
Fractionation Number Override: 0
Date Of Fraction 3: 01/02/2025
Number Of Days Off Treatment: 1
Functional Status: 0 (fully active)
Daily Dosage Administered For All Fractions (Required): 321.6
Simple Simulation Afterword Text Will Be Included With Simple Simulations (Indications............): The patient had a complete consultation regarding all applicable modalities for the treatment of their skin cancer and based on a variety of factors including the type of tumor, size, and location, the relevant medical history as well as local tissue factors, the functional status of the individual, the ability to perform necessary postoperative wound instructions and the need for simultaneous treatments as well as overall wound healing status, it was determined that the patient would begin radiation therapy treatment for skin cancer.  A full simulation and treatment device design was performed including the determination and formulation of appropriate simple and complex devices including lead shield of 0.762 mm thickness to form molded customized shielding to specifically correlate with the lesion size including treatment margin.  The custom lead shield is adequate to accommodate the appropriate applicator and provide adequate shielding around the treatment site.  The specific field applicator, shields, and devices both simple and complex as well as the specific patient setup is outlined below.  The patient was given a full consent for superficial radiation to both verbally and in writing and the full determination of patient's eligibility for treatment and selection is outlined on the patient eligibility and treatment selection form.  The specific superficial radiotherapy prescription was determined and was documented on the superficial radiotherapy prescription form.  A treatment calculation was also performed and documented on the treatment calculation form.  Based on the prescription, the patient was scheduled for a series of fractional treatments.
Treatment Device Design After Initial Simulation Justification (Will Render If Bill For Treatment Devices = Yes): The patient is status post radiation simulation and is evaluated as to the use of additional devices for shielding and placement for radiation therapy.
Fractions / Week: 2
Dimensions-Y Axis In Cm: 0.4
Computed Treatment Time In Min (Will Render The Same As Calculated Treatment Time If Left Blank): 0.44
Detail Level: Detailed
Field Size (Applicator): 1.5 cm
Total Dose (Optional-Please Include Units): 5467.2 cGy
Additional Prescription Justification Text: If there is any interruption in treatment exceeding 5 days please see Decay and Dose Adjustment Calculation and complete treatment under Prescription 2, 3 or 4 as appropriate.
Simple Simulation Preamble Text Will Be Included With Simple Simulations (.......... Indications): Simple simulation was performed today for the following reasons:
Date Of Fraction 4: 02/11/2025
Treatment Time / Fractionation (Optional- Include Units): 731 cGy/sec
Date Of Fraction 6: 02/18/2025
Date Of Fraction 8: 02/25/2025
Patient Positioning: Supine
Date Of Fraction 7: 02/20/2025
Date Of Fraction 5: 02/13/2025
Time Dose Fractionation (Optional- Include Units If Applicable): 0.44 min
Date Of Fraction 9: 02/27/2025
Date Of Fraction 17: 03/26/2025
Date Of Fraction 12: 03/11/2025
Date Of Fraction 13: 03/13/2025
Total Planned Fractions: 17
Assessment: Appropriate reaction
Energy Output In Cgy/Min (Optional): 731
Fractionation Number (Evaluation): No Treatment Administered
Ultrasound Used Text: Ultrasound was utilized to place radiation therapy fields.
Information: Selecting Yes will display possible errors in your note based on the variables you have selected. This validation is only offered as a suggestion for you. PLEASE NOTE THAT THE VALIDATION TEXT WILL BE REMOVED WHEN YOU FINALIZE YOUR NOTE. IF YOU WANT TO FAX A PRELIMINARY NOTE YOU WILL NEED TO TOGGLE THIS TO 'NO' IF YOU DO NOT WANT IT IN YOUR FAXED NOTE.
Dimensions-X Axis In Cm: 0.3
Other Assessment (Will Overide Current Assessment): Appropriate for SRT
Date Of Fraction 10: 03/04/2025
Ultrasound Used Text: Patient has a location which was not amenable to ultrasound.
Custom Shielding Afterword Text Will Not Be Included With Simple Simulations (X X Y Cm............): port to correlate with the lesion size, including treatment margin. The custom lead shield is adequate to accommodate the appropriate applicator and provide adequate shielding around the treatment site. Additional shielding (as noted below) is used to protect sensitive, normal tissues.
Ssd In Cm (Optional): 15
Date Of Fraction 1: 12/18/2024
Custom Shielding Preamble Text Will Not Be Included With Simple Simulations (.......... X X Y Cm): A lead shield of 0.762 mm thickness is utilized to form a molded, custom shield with a
Daily Fractionated Dose (Optional- Include Units): 321.6 cGy
Radiation Units: cGy
Use Automated Fraction Number And Cumulative Dosage?: Yes (Use Enhanced Automation)
Treatment Margins In Cm: 0.5
Date Of Fraction 14: 03/17/2025
Date Of Fraction 16: 03/24/2025

## 2025-03-31 ENCOUNTER — APPOINTMENT (OUTPATIENT)
Age: 85
Setting detail: DERMATOLOGY
End: 2025-03-31

## 2025-03-31 DIAGNOSIS — Z71.89 OTHER SPECIFIED COUNSELING: ICD-10-CM

## 2025-03-31 PROBLEM — C44.311 BASAL CELL CARCINOMA OF SKIN OF NOSE: Status: ACTIVE | Noted: 2025-03-31

## 2025-03-31 PROCEDURE — 77280 THER RAD SIMULAJ FIELD SMPL: CPT

## 2025-03-31 PROCEDURE — ? ADDITIONAL NOTES

## 2025-03-31 PROCEDURE — ? SUPERFICIAL RADIATION TREATMENT

## 2025-03-31 PROCEDURE — 77401: CPT

## 2025-03-31 NOTE — PROCEDURE: SUPERFICIAL RADIATION TREATMENT
Bill For Simulation: Yes - (Simple Simulation: 69657)
Validate Note Data (See Information Below): No
Field Number: Right Nasal Supratip ([id number])
Date Of Fraction 18: 03/31/2025
Intro Statement (Will Not Render If Left Blank): The patient is undergoing superficial radiation therapy for skin cancer and presents for weekly evaluation and management.  Per protocol and as documented on the flow sheet, the patient was questioned as to subjective redness, pruritus, pain, drainage, fatigue, or any other symptoms.  Objectively, the radiation area was evaluated with regards to erythema, atrophy, scale, crusting, erosion, ulceration, edema, purpura, tenderness, warmth, drainage, and any other findings.  The plan was extensively reviewed including the dose, and dosing schedule.  The simulation and clinical setup was also reviewed as was the external and any internal shields and based on this review the appropriateness and sufficiency of treatment was determined.
Date Of Fraction 11: 03/06/2025
Total Rx Dosage (Required): 5467.2
Date Of Fraction 2: 12/20/2024
Date Of Fraction 15: 03/21/2025
Energy (Optional-Please Include Units): 50 kV
Render Patient Eligibility And Selection In Note?: Yes
Port Dimensions-Y Axis In Cm: 3
Pathology Override (Pathology Will Render As Diagnosis Name If Left Blank): Superficial and Nodular BCC
Please Choose The Type Of Visit (Required): Treatment and Simulation Visit: Show Treatment/Simulation Variables
Fractionation Number Override: 0
Date Of Fraction 3: 01/02/2025
Number Of Days Off Treatment: 1
Functional Status: 0 (fully active)
Daily Dosage Administered For All Fractions (Required): 321.6
Simple Simulation Afterword Text Will Be Included With Simple Simulations (Indications............): The patient had a complete consultation regarding all applicable modalities for the treatment of their skin cancer and based on a variety of factors including the type of tumor, size, and location, the relevant medical history as well as local tissue factors, the functional status of the individual, the ability to perform necessary postoperative wound instructions and the need for simultaneous treatments as well as overall wound healing status, it was determined that the patient would begin radiation therapy treatment for skin cancer.  A full simulation and treatment device design was performed including the determination and formulation of appropriate simple and complex devices including lead shield of 0.762 mm thickness to form molded customized shielding to specifically correlate with the lesion size including treatment margin.  The custom lead shield is adequate to accommodate the appropriate applicator and provide adequate shielding around the treatment site.  The specific field applicator, shields, and devices both simple and complex as well as the specific patient setup is outlined below.  The patient was given a full consent for superficial radiation to both verbally and in writing and the full determination of patient's eligibility for treatment and selection is outlined on the patient eligibility and treatment selection form.  The specific superficial radiotherapy prescription was determined and was documented on the superficial radiotherapy prescription form.  A treatment calculation was also performed and documented on the treatment calculation form.  Based on the prescription, the patient was scheduled for a series of fractional treatments.
Treatment Device Design After Initial Simulation Justification (Will Render If Bill For Treatment Devices = Yes): The patient is status post radiation simulation and is evaluated as to the use of additional devices for shielding and placement for radiation therapy.
Fractions / Week: 2
Dimensions-Y Axis In Cm: 0.4
Computed Treatment Time In Min (Will Render The Same As Calculated Treatment Time If Left Blank): 0.44
Detail Level: Detailed
Field Size (Applicator): 1.5 cm
Total Dose (Optional-Please Include Units): 5467.2 cGy
Additional Prescription Justification Text: If there is any interruption in treatment exceeding 5 days please see Decay and Dose Adjustment Calculation and complete treatment under Prescription 2, 3 or 4 as appropriate.
Simple Simulation Preamble Text Will Be Included With Simple Simulations (.......... Indications): Simple simulation was performed today for the following reasons:
Date Of Fraction 4: 02/11/2025
Treatment Time / Fractionation (Optional- Include Units): 731 cGy/sec
Date Of Fraction 6: 02/18/2025
Date Of Fraction 8: 02/25/2025
Patient Positioning: Supine
Date Of Fraction 7: 02/20/2025
Date Of Fraction 5: 02/13/2025
Time Dose Fractionation (Optional- Include Units If Applicable): 0.44 min
Date Of Fraction 9: 02/27/2025
Date Of Fraction 17: 03/26/2025
Date Of Fraction 12: 03/11/2025
Date Of Fraction 13: 03/13/2025
Total Planned Fractions: 17
Assessment: Appropriate reaction
Energy Output In Cgy/Min (Optional): 731
Fractionation Number (Evaluation): No Treatment Administered
Ultrasound Used Text: Ultrasound was utilized to place radiation therapy fields.
Information: Selecting Yes will display possible errors in your note based on the variables you have selected. This validation is only offered as a suggestion for you. PLEASE NOTE THAT THE VALIDATION TEXT WILL BE REMOVED WHEN YOU FINALIZE YOUR NOTE. IF YOU WANT TO FAX A PRELIMINARY NOTE YOU WILL NEED TO TOGGLE THIS TO 'NO' IF YOU DO NOT WANT IT IN YOUR FAXED NOTE.
Dimensions-X Axis In Cm: 0.3
Other Assessment (Will Overide Current Assessment): Appropriate for SRT
Date Of Fraction 10: 03/04/2025
Ultrasound Used Text: Patient has a location which was not amenable to ultrasound.
Custom Shielding Afterword Text Will Not Be Included With Simple Simulations (X X Y Cm............): port to correlate with the lesion size, including treatment margin. The custom lead shield is adequate to accommodate the appropriate applicator and provide adequate shielding around the treatment site. Additional shielding (as noted below) is used to protect sensitive, normal tissues.
Ssd In Cm (Optional): 15
Date Of Fraction 1: 12/18/2024
Custom Shielding Preamble Text Will Not Be Included With Simple Simulations (.......... X X Y Cm): A lead shield of 0.762 mm thickness is utilized to form a molded, custom shield with a
Daily Fractionated Dose (Optional- Include Units): 321.6 cGy
Radiation Units: cGy
Use Automated Fraction Number And Cumulative Dosage?: Yes (Use Enhanced Automation)
Treatment Margins In Cm: 0.5
Date Of Fraction 14: 03/17/2025
Date Of Fraction 16: 03/24/2025

## 2025-03-31 NOTE — PROCEDURE: ADDITIONAL NOTES
Render Risk Assessment In Note?: no
Additional Notes: Medicare - I, Dr. Thuan Miles, attest that my ACGME accredited resident/fellow physician at the [facility] residency program and myself personally met with the patient face to face and examined the patient and I developed the plan of care. As the  and primary teaching physician which involves residents in providing care at [facility], I am able to provide the necessary direction, management and review for the resident’s services by direct supervision with the resident and the patient. Incident to services are being performed under the direct supervision of Dr Miles, initiation and continued involvement in treatment is always followed and he is immediately available as he is within the walls of this office during treatment to provide assistance and direction throughout the time the resident or fellow is performing services.
Detail Level: Simple
Additional Notes: Patient requires an 18th dose of radiation. SRT Sensus TDF calculated to be < 95, which indicates one additional radiation dose is required for treatment completion.\\n\\nPatient referred from outside dermatologist, to follow up with them in 6-8 weeks for monitoring of location of treatment.
Detail Level: Generalized

## 2025-03-31 NOTE — PROCEDURE: MIPS QUALITY
Detail Level: Detailed
Quality 226: Preventive Care And Screening: Tobacco Use: Screening And Cessation Intervention: Patient screened for tobacco use and is an ex/non-smoker
Name And Contact Information For Health Care Proxy: Kevin Dubose, spouse [phone]
Quality 47: Advance Care Plan: Advance Care Planning discussed and documented; advance care plan or surrogate decision maker documented in the medical record.

## 2025-08-14 ENCOUNTER — OFFICE VISIT (OUTPATIENT)
Dept: URBAN - METROPOLITAN AREA CLINIC 128 | Facility: CLINIC | Age: 85
End: 2025-08-14
Payer: MEDICARE

## 2025-08-14 ENCOUNTER — LAB OUTSIDE AN ENCOUNTER (OUTPATIENT)
Dept: URBAN - METROPOLITAN AREA CLINIC 128 | Facility: CLINIC | Age: 85
End: 2025-08-14

## 2025-08-14 DIAGNOSIS — K86.2 CYST OF PANCREAS: ICD-10-CM

## 2025-08-14 DIAGNOSIS — Z12.11 COLON CANCER SCREENING: ICD-10-CM

## 2025-08-14 PROCEDURE — 99214 OFFICE O/P EST MOD 30 MIN: CPT | Performed by: PHYSICIAN ASSISTANT

## 2025-08-14 RX ORDER — CLOPIDOGREL 75 MG/1
1 TABLET TABLET, FILM COATED ORAL ONCE A DAY
Status: ACTIVE | COMMUNITY

## 2025-08-14 RX ORDER — LEVOTHYROXINE SODIUM 88 UG/1
1 TABLET IN THE MORNING ON AN EMPTY STOMACH TABLET ORAL ONCE A DAY
Status: ACTIVE | COMMUNITY

## 2025-08-14 RX ORDER — DAPAGLIFLOZIN 5 MG/1
1 TABLET TABLET, FILM COATED ORAL ONCE A DAY
Status: ACTIVE | COMMUNITY

## 2025-08-14 RX ORDER — ASPIRIN 81 MG/1
1 TABLET TABLET, COATED ORAL ONCE A DAY
Status: ACTIVE | COMMUNITY

## 2025-08-14 RX ORDER — LOSARTAN POTASSIUM 25 MG/1
1 TABLET TABLET ORAL ONCE A DAY
Status: ACTIVE | COMMUNITY

## 2025-08-14 RX ORDER — ROSUVASTATIN CALCIUM 20 MG/1
1 TABLET TABLET, FILM COATED ORAL ONCE A DAY
Status: ACTIVE | COMMUNITY

## 2025-08-14 RX ORDER — POLYETHYLENE GLYCOL 3350, SODIUM SULFATE ANHYDROUS, SODIUM BICARBONATE, SODIUM CHLORIDE, POTASSIUM CHLORIDE 236; 22.74; 6.74; 5.86; 2.97 G/4L; G/4L; G/4L; G/4L; G/4L
POWDER, FOR SOLUTION ORAL ONCE A DAY
Qty: 1 KIT | Refills: 0 | OUTPATIENT
Start: 2025-08-14 | End: 2025-08-15

## 2025-08-27 ENCOUNTER — LAB OUTSIDE AN ENCOUNTER (OUTPATIENT)
Dept: URBAN - METROPOLITAN AREA CLINIC 80 | Facility: CLINIC | Age: 85
End: 2025-08-27